# Patient Record
Sex: MALE | Race: WHITE | NOT HISPANIC OR LATINO
[De-identification: names, ages, dates, MRNs, and addresses within clinical notes are randomized per-mention and may not be internally consistent; named-entity substitution may affect disease eponyms.]

---

## 2017-03-16 ENCOUNTER — NON-APPOINTMENT (OUTPATIENT)
Age: 57
End: 2017-03-16

## 2017-03-16 ENCOUNTER — APPOINTMENT (OUTPATIENT)
Dept: CARDIOLOGY | Facility: CLINIC | Age: 57
End: 2017-03-16

## 2017-03-16 VITALS
HEART RATE: 89 BPM | TEMPERATURE: 98.5 F | HEIGHT: 68 IN | WEIGHT: 315 LBS | BODY MASS INDEX: 47.74 KG/M2 | SYSTOLIC BLOOD PRESSURE: 160 MMHG | DIASTOLIC BLOOD PRESSURE: 111 MMHG | OXYGEN SATURATION: 98 %

## 2017-03-16 VITALS — DIASTOLIC BLOOD PRESSURE: 80 MMHG | SYSTOLIC BLOOD PRESSURE: 150 MMHG

## 2017-03-16 DIAGNOSIS — R07.9 CHEST PAIN, UNSPECIFIED: ICD-10-CM

## 2017-03-19 ENCOUNTER — EMERGENCY (EMERGENCY)
Facility: HOSPITAL | Age: 57
LOS: 1 days | Discharge: HOME | End: 2017-03-19
Attending: EMERGENCY MEDICINE | Admitting: EMERGENCY MEDICINE
Payer: MEDICAID

## 2017-03-19 VITALS
HEART RATE: 90 BPM | DIASTOLIC BLOOD PRESSURE: 91 MMHG | TEMPERATURE: 98 F | OXYGEN SATURATION: 98 % | SYSTOLIC BLOOD PRESSURE: 173 MMHG | RESPIRATION RATE: 20 BRPM

## 2017-03-19 VITALS
HEART RATE: 92 BPM | SYSTOLIC BLOOD PRESSURE: 162 MMHG | TEMPERATURE: 98 F | RESPIRATION RATE: 20 BRPM | DIASTOLIC BLOOD PRESSURE: 115 MMHG | OXYGEN SATURATION: 95 %

## 2017-03-19 DIAGNOSIS — Z98.89 OTHER SPECIFIED POSTPROCEDURAL STATES: Chronic | ICD-10-CM

## 2017-03-19 LAB
ALBUMIN SERPL ELPH-MCNC: 3.9 G/DL — SIGNIFICANT CHANGE UP (ref 3.3–5)
ALP SERPL-CCNC: 83 U/L — SIGNIFICANT CHANGE UP (ref 40–120)
ALT FLD-CCNC: 15 U/L — SIGNIFICANT CHANGE UP (ref 4–41)
AST SERPL-CCNC: 20 U/L — SIGNIFICANT CHANGE UP (ref 4–40)
BASOPHILS # BLD AUTO: 0.02 K/UL — SIGNIFICANT CHANGE UP (ref 0–0.2)
BASOPHILS NFR BLD AUTO: 0.3 % — SIGNIFICANT CHANGE UP (ref 0–2)
BILIRUB SERPL-MCNC: 0.5 MG/DL — SIGNIFICANT CHANGE UP (ref 0.2–1.2)
BUN SERPL-MCNC: 16 MG/DL — SIGNIFICANT CHANGE UP (ref 7–23)
CALCIUM SERPL-MCNC: 9.6 MG/DL — SIGNIFICANT CHANGE UP (ref 8.4–10.5)
CHLORIDE SERPL-SCNC: 99 MMOL/L — SIGNIFICANT CHANGE UP (ref 98–107)
CO2 SERPL-SCNC: 30 MMOL/L — SIGNIFICANT CHANGE UP (ref 22–31)
CREAT SERPL-MCNC: 0.94 MG/DL — SIGNIFICANT CHANGE UP (ref 0.5–1.3)
EOSINOPHIL # BLD AUTO: 0.16 K/UL — SIGNIFICANT CHANGE UP (ref 0–0.5)
EOSINOPHIL NFR BLD AUTO: 2.3 % — SIGNIFICANT CHANGE UP (ref 0–6)
GLUCOSE SERPL-MCNC: 134 MG/DL — HIGH (ref 70–99)
HCT VFR BLD CALC: 38.1 % — LOW (ref 39–50)
HGB BLD-MCNC: 12.5 G/DL — LOW (ref 13–17)
IMM GRANULOCYTES NFR BLD AUTO: 0.3 % — SIGNIFICANT CHANGE UP (ref 0–1.5)
LYMPHOCYTES # BLD AUTO: 1.09 K/UL — SIGNIFICANT CHANGE UP (ref 1–3.3)
LYMPHOCYTES # BLD AUTO: 15.9 % — SIGNIFICANT CHANGE UP (ref 13–44)
MCHC RBC-ENTMCNC: 27.8 PG — SIGNIFICANT CHANGE UP (ref 27–34)
MCHC RBC-ENTMCNC: 32.8 % — SIGNIFICANT CHANGE UP (ref 32–36)
MCV RBC AUTO: 84.9 FL — SIGNIFICANT CHANGE UP (ref 80–100)
MONOCYTES # BLD AUTO: 0.61 K/UL — SIGNIFICANT CHANGE UP (ref 0–0.9)
MONOCYTES NFR BLD AUTO: 8.9 % — SIGNIFICANT CHANGE UP (ref 2–14)
NEUTROPHILS # BLD AUTO: 4.97 K/UL — SIGNIFICANT CHANGE UP (ref 1.8–7.4)
NEUTROPHILS NFR BLD AUTO: 72.3 % — SIGNIFICANT CHANGE UP (ref 43–77)
PLATELET # BLD AUTO: 184 K/UL — SIGNIFICANT CHANGE UP (ref 150–400)
PMV BLD: 9.3 FL — SIGNIFICANT CHANGE UP (ref 7–13)
POTASSIUM SERPL-MCNC: 4.4 MMOL/L — SIGNIFICANT CHANGE UP (ref 3.5–5.3)
POTASSIUM SERPL-SCNC: 4.4 MMOL/L — SIGNIFICANT CHANGE UP (ref 3.5–5.3)
PROT SERPL-MCNC: 7.1 G/DL — SIGNIFICANT CHANGE UP (ref 6–8.3)
RBC # BLD: 4.49 M/UL — SIGNIFICANT CHANGE UP (ref 4.2–5.8)
RBC # FLD: 15.1 % — HIGH (ref 10.3–14.5)
SODIUM SERPL-SCNC: 141 MMOL/L — SIGNIFICANT CHANGE UP (ref 135–145)
WBC # BLD: 6.87 K/UL — SIGNIFICANT CHANGE UP (ref 3.8–10.5)
WBC # FLD AUTO: 6.87 K/UL — SIGNIFICANT CHANGE UP (ref 3.8–10.5)

## 2017-03-19 PROCEDURE — 70450 CT HEAD/BRAIN W/O DYE: CPT | Mod: 26

## 2017-03-19 PROCEDURE — 99283 EMERGENCY DEPT VISIT LOW MDM: CPT

## 2017-03-19 RX ORDER — AMLODIPINE BESYLATE 2.5 MG/1
1 TABLET ORAL
Qty: 7 | Refills: 0 | OUTPATIENT
Start: 2017-03-19 | End: 2017-03-26

## 2017-03-19 NOTE — ED PROVIDER NOTE - ATTENDING CONTRIBUTION TO CARE
ED Attending Dr. Mccollum: 57 yo male with HTN, DM, Afib on Eliquis, in ED with recent HTN and today feeling of "pressure" in head but no headache.  Pt concerned because mother had recent stroke.  Pt notes that he is in the process of adjusting BP control medications with PMD.  On exam pt well appearing, in NAD, heart RRR, lungs CTAB, abd NTND, extremities without swelling, strength 5/5 in all extremities and skin without rash.

## 2017-03-19 NOTE — ED PROVIDER NOTE - PMH
Disc displacement, lumbar    DM II (diabetes mellitus, type II), controlled    Glaucoma    HTN (hypertension)    Morbid obesity with BMI of 60.0-69.9, adult    Spondylosis of lumbar joint

## 2017-03-19 NOTE — ED ADULT TRIAGE NOTE - CHIEF COMPLAINT QUOTE
Pt c/o head pressure this am 5/10 denies blurry vision or dizziness . Pt is obese. States took b/p medication this am.  finger stick 127.

## 2017-03-19 NOTE — ED PROVIDER NOTE - PLAN OF CARE
Please follow up with your primary care doctor in 1-2 days  Take norvasc 10mg at night in addition to your other BP meds   Return to ED for worsening symptoms, headaches, weakness or other concerns

## 2017-03-19 NOTE — ED ADULT NURSE NOTE - OBJECTIVE STATEMENT
pt received a&ox3, c/o 20 minute episode of dizziness this morning, pt denies dizziness at current time, denies hx of symptom, denies cp/sob/nvd/urinary symptoms, pt is noted to be obese, states he had spinal surgery several years ago without complications, pts vs as reported, pt placed on monitor, 22 gauge IV placed in right ac, labs drawn and sent, will continue to monitor.

## 2017-03-19 NOTE — ED PROVIDER NOTE - CARE PLAN
Principal Discharge DX:	Hypertension  Instructions for follow-up, activity and diet:	Please follow up with your primary care doctor in 1-2 days  Take norvasc 10mg at night in addition to your other BP meds   Return to ED for worsening symptoms, headaches, weakness or other concerns

## 2017-03-19 NOTE — ED PROVIDER NOTE - OBJECTIVE STATEMENT
55yo male h/o obesity, DM, HTN, Afib on eliquis, p/w high BP and head prssure this morning. Pt has been having elevated BPs for the last few days despite antihypertensives, has been followed by cards and pmd for this and was told to just watch. Pt this morning had head pressure and discomfort, no associated pain, nausea, vomiting, vision changes, weakness. Now asymptomatic, but during episode BP was >210/110

## 2017-04-04 ENCOUNTER — MEDICATION RENEWAL (OUTPATIENT)
Age: 57
End: 2017-04-04

## 2017-09-07 ENCOUNTER — APPOINTMENT (OUTPATIENT)
Dept: CARDIOLOGY | Facility: CLINIC | Age: 57
End: 2017-09-07
Payer: MEDICAID

## 2017-09-07 ENCOUNTER — NON-APPOINTMENT (OUTPATIENT)
Age: 57
End: 2017-09-07

## 2017-09-07 VITALS
SYSTOLIC BLOOD PRESSURE: 151 MMHG | HEIGHT: 68 IN | HEART RATE: 77 BPM | DIASTOLIC BLOOD PRESSURE: 88 MMHG | OXYGEN SATURATION: 99 % | TEMPERATURE: 97.8 F | BODY MASS INDEX: 47.74 KG/M2 | WEIGHT: 315 LBS

## 2017-09-07 PROCEDURE — 93000 ELECTROCARDIOGRAM COMPLETE: CPT

## 2017-09-07 PROCEDURE — 99214 OFFICE O/P EST MOD 30 MIN: CPT

## 2018-03-19 ENCOUNTER — RX RENEWAL (OUTPATIENT)
Age: 58
End: 2018-03-19

## 2018-03-27 ENCOUNTER — APPOINTMENT (OUTPATIENT)
Dept: CARDIOLOGY | Facility: CLINIC | Age: 58
End: 2018-03-27
Payer: MEDICAID

## 2018-03-27 ENCOUNTER — NON-APPOINTMENT (OUTPATIENT)
Age: 58
End: 2018-03-27

## 2018-03-27 VITALS
HEART RATE: 89 BPM | BODY MASS INDEX: 67.97 KG/M2 | DIASTOLIC BLOOD PRESSURE: 98 MMHG | WEIGHT: 315 LBS | OXYGEN SATURATION: 97 % | SYSTOLIC BLOOD PRESSURE: 178 MMHG

## 2018-03-27 VITALS — HEART RATE: 80 BPM | SYSTOLIC BLOOD PRESSURE: 122 MMHG | DIASTOLIC BLOOD PRESSURE: 70 MMHG

## 2018-03-27 PROCEDURE — 99214 OFFICE O/P EST MOD 30 MIN: CPT

## 2018-03-27 PROCEDURE — 93000 ELECTROCARDIOGRAM COMPLETE: CPT

## 2018-05-23 ENCOUNTER — RX RENEWAL (OUTPATIENT)
Age: 58
End: 2018-05-23

## 2018-11-26 ENCOUNTER — RX RENEWAL (OUTPATIENT)
Age: 58
End: 2018-11-26

## 2018-11-26 ENCOUNTER — MEDICATION RENEWAL (OUTPATIENT)
Age: 58
End: 2018-11-26

## 2018-11-27 ENCOUNTER — MEDICATION RENEWAL (OUTPATIENT)
Age: 58
End: 2018-11-27

## 2018-12-04 ENCOUNTER — APPOINTMENT (OUTPATIENT)
Dept: CARDIOLOGY | Facility: CLINIC | Age: 58
End: 2018-12-04
Payer: MEDICAID

## 2018-12-04 ENCOUNTER — NON-APPOINTMENT (OUTPATIENT)
Age: 58
End: 2018-12-04

## 2018-12-04 VITALS
HEART RATE: 91 BPM | TEMPERATURE: 98.8 F | OXYGEN SATURATION: 96 % | WEIGHT: 315 LBS | DIASTOLIC BLOOD PRESSURE: 82 MMHG | SYSTOLIC BLOOD PRESSURE: 133 MMHG | HEIGHT: 68 IN | BODY MASS INDEX: 47.74 KG/M2

## 2018-12-04 PROCEDURE — 99214 OFFICE O/P EST MOD 30 MIN: CPT

## 2018-12-04 PROCEDURE — 93000 ELECTROCARDIOGRAM COMPLETE: CPT

## 2018-12-04 NOTE — REASON FOR VISIT
[FreeTextEntry1] : The patient is a 58-year-old male with morbid obesity, with a history of atrial fibrillation, type 2 diabetes, hypertension, and obstructive sleep apnea for followup.

## 2018-12-04 NOTE — REVIEW OF SYSTEMS
[see HPI] : see HPI [Shortness Of Breath] : shortness of breath [Depression] : depression [Negative] : Heme/Lymph [Chest Pain] : no chest pain [Lower Ext Edema] : no extremity edema [Leg Claudication] : no intermittent leg claudication [Abdominal Pain] : no abdominal pain [Heartburn] : no heartburn

## 2018-12-04 NOTE — PHYSICAL EXAM
[Well Groomed] : well groomed [No Deformities] : no deformities [General Appearance - In No Acute Distress] : no acute distress [Normal Conjunctiva] : the conjunctiva exhibited no abnormalities [Eyelids - No Xanthelasma] : the eyelids demonstrated no xanthelasmas [Normal Oral Mucosa] : normal oral mucosa [No Oral Pallor] : no oral pallor [No Oral Cyanosis] : no oral cyanosis [Respiration, Rhythm And Depth] : normal respiratory rhythm and effort [Exaggerated Use Of Accessory Muscles For Inspiration] : no accessory muscle use [Auscultation Breath Sounds / Voice Sounds] : lungs were clear to auscultation bilaterally [Heart Rate And Rhythm] : heart rate and rhythm were normal [Heart Sounds] : normal S1 and S2 [Murmurs] : no murmurs present [Abdomen Soft] : soft [Abdomen Tenderness] : non-tender [Abdomen Mass (___ Cm)] : no abdominal mass palpated [Abnormal Walk] : normal gait [Gait - Sufficient For Exercise Testing] : the gait was sufficient for exercise testing [Nail Clubbing] : no clubbing of the fingernails [Cyanosis, Localized] : no localized cyanosis [Petechial Hemorrhages (___cm)] : no petechial hemorrhages [Skin Color & Pigmentation] : normal skin color and pigmentation [] : no rash [No Venous Stasis] : no venous stasis [Skin Lesions] : no skin lesions [No Skin Ulcers] : no skin ulcer [No Xanthoma] : no  xanthoma was observed [Oriented To Time, Place, And Person] : oriented to person, place, and time [Affect] : the affect was normal [Mood] : the mood was normal [No Anxiety] : not feeling anxious [FreeTextEntry1] : Huge legs and ankles, but no pitting edema

## 2018-12-04 NOTE — HISTORY OF PRESENT ILLNESS
[FreeTextEntry1] : The patient was found to have atrial fibrillation in 2014. He was initially on diltiazem, but because of peripheral edema, he was switched to carvedilol. \par For several years the patient has been experiencing upper abdominal pulling pain when he walks. Relieved by rest. He had a cardiac catheterization in 2015 which revealed minimal irregularities of his coronaries. He had no change in his symptoms.\par September 7, 2017. Patient here in followup. He and his sister deny any medical issues, change in medication, new symptoms, etc. He had labs just last week with Dr. Zelaya his primary care. His weight may be down 10 pounds otherwise no change.\par March 27, 2018. Patient here in followup. Denies any interval change in medical issues, medication, no hospitalizations, no ER visits, procedures et cetera. Labs from his internist from March 16 hemoglobin A1c 7.2, BUN 18, creatinine 0.89, cholesterol 119, triglycerides 116, HDL 30, LDL 66. \par  December 4, 2018. Patient here in followup. EKG shows atrial fibrillation with a ventricular rate of 93. Otherwise, no change discussion with hypertension, and different medications, as well as ocular migraines\par December 4, 2018. Patient here in followup. Has no complaints.. He claimed that they had spoken to other doctors and his problem is not overeating by a metabolic one and that surgery would not help him and is not from his diet. He has no other complaints. He had labs with Dr. Zelaya in March which I do not have at this time

## 2019-01-08 ENCOUNTER — APPOINTMENT (OUTPATIENT)
Dept: PULMONOLOGY | Facility: CLINIC | Age: 59
End: 2019-01-08
Payer: MEDICAID

## 2019-01-08 VITALS
BODY MASS INDEX: 47.74 KG/M2 | DIASTOLIC BLOOD PRESSURE: 86 MMHG | HEIGHT: 68 IN | TEMPERATURE: 98.4 F | RESPIRATION RATE: 16 BRPM | SYSTOLIC BLOOD PRESSURE: 158 MMHG | HEART RATE: 91 BPM | WEIGHT: 315 LBS

## 2019-01-08 PROCEDURE — 99203 OFFICE O/P NEW LOW 30 MIN: CPT

## 2019-01-08 NOTE — PHYSICAL EXAM
[Normal Appearance] : normal appearance [Well Groomed] : well groomed [General Appearance - In No Acute Distress] : no acute distress [Normal Conjunctiva] : the conjunctiva exhibited no abnormalities [IV] : IV [Neck Appearance] : the appearance of the neck was normal [Neck Circumference: ___] : neck circumference is [unfilled] [Heart Rate And Rhythm] : heart rate was normal and rhythm regular [Murmurs] : no murmurs [] : no respiratory distress [Auscultation Breath Sounds / Voice Sounds] : lungs were clear to auscultation bilaterally [Involuntary Movements] : no involuntary movements were seen [Non-Pitting] : non-pitting [No Focal Deficits] : no focal deficits [Affect] : the affect was normal [Mood] : the mood was normal

## 2019-01-08 NOTE — REVIEW OF SYSTEMS
[Palpitations] : palpitations [Obesity] : obesity [Diabetes] : diabetes  [Arthralgias] : arthralgias [Awakes With Dry Mouth] : awakes with dry mouth [Recent Wt Gain (___ Lbs)] : recent [unfilled] ~Ulb weight gain [Negative] : Psychiatric [Fatigue] : no fatigue [Nasal Congestion] : no nasal congestion [Postnasal Drip] : no postnasal drip [Shortness Of Breath] : no shortness of breath [Chest Pain] : no chest pain [Thyroid Disease] : no thyroid disease [Snoring] : no snoring [Witnessed Apneas] : demonstrated no ~M apnea [Frequent Nocturnal Awakenings] : no nocturnal awakenings from sleep [Daytime Somnolence: ESS=____] : no daytime somnolence [Unintentional Sleep while active] : no unintentional sleep while active [Unintentional Sleep while inactive] : no unintentional sleep while inactive [Awakes Unrefreshed] : restorative sleep [Awakes With Headache] : awakes without a headache [Difficulty Initiating Sleep] : no difficulty falling asleep [Difficulty Maintaining Sleep] : no difficulty maintaining sleep [Lower Extremity Discomfort] : no lower extremity discomfort [Unusual Sleep Behavior] : no unusual sleep behavior [FreeTextEntry1] : 8-11 PM  [FreeTextEntry2] : 7 AM [FreeTextEntry3] : within 15 minutes [FreeTextEntry4] : 0 [FreeTextEntry6] : 8-10 hours [FreeTextEntry7] : none

## 2019-01-08 NOTE — ASSESSMENT
[FreeTextEntry1] : 58 year old LAYLA MCGINNIS with comorbid AF, HTN, DM, spinal stenosis, morbid obesity, presents for continued management of severe obstructive sleep apnea and nocturnal hypoventilation on APAP therapy with supplemental oxygen.\par \par A review of THERAPY & COMPLIANCE DATA reveals excellent compliance with usage on 100% of nights averaging 8 hours 59 minutes; effective APAP pressures at 11-20 cmH2O with therapy based AHI of 0.1 per hour, and no significant mask leak. \par \par The patient continues to benefit from APAP therapy with resolution of ONEL-related symptoms and should continue  therapy at the current pressure settings, along with supplementary nocturnal oxygen at 2 LPM.  An order to switch DME company and renewal of supplies was entered.  He will likely be eligible for a new replacement APAP device next year.  He actually gained 18 lbs weight since his last titration in 2014.\par \par He will follow-up in one year or sooner if needed.

## 2019-01-08 NOTE — HISTORY OF PRESENT ILLNESS
[AHI: ___ per hour] : Apnea-hypopnea index:  [unfilled] per hour [T90%: ___] : T90%: [unfilled]% [Baljinder desatuation%: ___] : Baljinder desaturation:  [unfilled]% [Date: ___] : the most recent therapeutic polysomnogram was completed [unfilled] [CPAP: ___ cmH2O] : CPAP: [unfilled] cmH2O [Nocturnal Oxygen] : The patient uses nocturnal oxygen [O2 Rate: ___] : O2 rate is [unfilled] lpm [% Days used > 4 hrs: ____] : Days used > 4 hrs: [unfilled] % [Mean nightly usage: ___ hrs] : Mean nightly usage: [unfilled] hrs [Therapy based AHI: ___ /hr] : Therapy based AHI: [unfilled] / hr [% Days used: ____] : Days used: [unfilled] % [FreeTextEntry1] : This 58 year old male with history of severe obstructive sleep apnea treated with APAP and nocturnal hypoventilation treated with supplementary oxygen at 2 LPM, presents for follow-up visit to 2014 visit.\par \par COMORBID:  AF, HTN, DM, spinal stenosis, and morbid obesity.\par \par The patient has been treated with APAP and supplementary oxygen since he was diagnosed with severe ONEL and nocturnal hypoventilation in 2014.  With nightly APAP use for 8 to 10 hours, he reports resolution to daytime sleepiness, snoring, and nocturnal awakenings.  He occasionally awakens with a dry mouth.  He reportedly gained ~50 lbs since 2014.  He requests a change in [employer] company to [email or web address], which also services [place] where they vacation.\par \par He sleeps 8-10 hours between 8PM-7AM.  He occasionally sleeps with the TV on (for the noise), but faces away from it.  During the day , he watches TV, plays video games, and cooks.

## 2019-02-07 ENCOUNTER — APPOINTMENT (OUTPATIENT)
Dept: SLEEP CENTER | Facility: CLINIC | Age: 59
End: 2019-02-07
Payer: MEDICAID

## 2019-02-07 ENCOUNTER — OUTPATIENT (OUTPATIENT)
Dept: OUTPATIENT SERVICES | Facility: HOSPITAL | Age: 59
LOS: 1 days | End: 2019-02-07
Payer: MEDICAID

## 2019-02-07 DIAGNOSIS — Z98.89 OTHER SPECIFIED POSTPROCEDURAL STATES: Chronic | ICD-10-CM

## 2019-02-07 PROCEDURE — 95810 POLYSOM 6/> YRS 4/> PARAM: CPT | Mod: 26

## 2019-02-07 PROCEDURE — 95810 POLYSOM 6/> YRS 4/> PARAM: CPT

## 2019-02-08 DIAGNOSIS — G47.33 OBSTRUCTIVE SLEEP APNEA (ADULT) (PEDIATRIC): ICD-10-CM

## 2019-04-14 ENCOUNTER — APPOINTMENT (OUTPATIENT)
Dept: SLEEP CENTER | Facility: CLINIC | Age: 59
End: 2019-04-14
Payer: MEDICAID

## 2019-04-14 ENCOUNTER — OUTPATIENT (OUTPATIENT)
Dept: OUTPATIENT SERVICES | Facility: HOSPITAL | Age: 59
LOS: 1 days | End: 2019-04-14
Payer: MEDICAID

## 2019-04-14 DIAGNOSIS — Z98.89 OTHER SPECIFIED POSTPROCEDURAL STATES: Chronic | ICD-10-CM

## 2019-04-14 PROCEDURE — 95811 POLYSOM 6/>YRS CPAP 4/> PARM: CPT

## 2019-04-14 PROCEDURE — 95811 POLYSOM 6/>YRS CPAP 4/> PARM: CPT | Mod: 26

## 2019-04-16 DIAGNOSIS — G47.33 OBSTRUCTIVE SLEEP APNEA (ADULT) (PEDIATRIC): ICD-10-CM

## 2020-03-02 ENCOUNTER — APPOINTMENT (OUTPATIENT)
Dept: PULMONOLOGY | Facility: CLINIC | Age: 60
End: 2020-03-02

## 2020-03-02 ENCOUNTER — FORM ENCOUNTER (OUTPATIENT)
Age: 60
End: 2020-03-02

## 2020-03-04 ENCOUNTER — APPOINTMENT (OUTPATIENT)
Dept: PULMONOLOGY | Facility: CLINIC | Age: 60
End: 2020-03-04
Payer: MEDICAID

## 2020-03-04 VITALS
HEART RATE: 103 BPM | RESPIRATION RATE: 16 BRPM | DIASTOLIC BLOOD PRESSURE: 89 MMHG | TEMPERATURE: 97.3 F | BODY MASS INDEX: 47.74 KG/M2 | OXYGEN SATURATION: 96 % | HEIGHT: 68 IN | WEIGHT: 315 LBS | SYSTOLIC BLOOD PRESSURE: 132 MMHG

## 2020-03-04 PROCEDURE — 99213 OFFICE O/P EST LOW 20 MIN: CPT

## 2020-03-04 NOTE — ASSESSMENT
[FreeTextEntry1] : 59 year old LAYLA MCGINNIS with AF, HTN, DM, morbid obesity, glaucoma, spinal stenosis; presents with stable nocturnal hypoventilation on supplementary oxygen and well-controlled severe obstructive sleep apnea on APAP therapy.\par \par A review of therapeutic and compliance data reveals usage on 100% of nights averaging 9 hours 11 minutes; effective APAP pressures at 10-20 cmH2O with therapy based AHI of 0.1, and no significant mask leak.   \par \par The patient continues to derive benefit from APAP therapy and supplementary oxygen with resolution of ONEL-related symptoms including hypersomnolence, fatigue, energy level, and snoring.  An order for renewal of supplies was placed to continue PAP therapy at the current pressure settings.  They are very pleased with therapy. \par \par He was advised to adjust the humidification setting on his APAP to relieve rhinorrhea and dry mouth.  Questions were answered regarding PAP masks and headgear.  \par \par He will follow up in one year or sooner if needed.

## 2020-03-04 NOTE — PHYSICAL EXAM
[Normal Appearance] : normal appearance [Well Groomed] : well groomed [General Appearance - In No Acute Distress] : no acute distress [Normal Conjunctiva] : the conjunctiva exhibited no abnormalities [Enlarged Base of the Tongue] : enlargement of the base of the tongue [Low Lying Soft Palate] : low lying soft palate [IV] : IV [Heart Rate And Rhythm] : heart rate was normal and rhythm regular [Neck Appearance] : the appearance of the neck was normal [] : no respiratory distress [Murmurs] : no murmurs [Auscultation Breath Sounds / Voice Sounds] : lungs were clear to auscultation bilaterally [Involuntary Movements] : no involuntary movements were seen [Non-Pitting] : non-pitting [No Focal Deficits] : no focal deficits [Affect] : the affect was normal [Mood] : the mood was normal

## 2020-03-04 NOTE — REVIEW OF SYSTEMS
[Obesity] : obesity [Diabetes] : diabetes  [Unintentional Sleep while inactive] : unintentional sleep while inactive [Recent Wt Loss (___ Lbs)] : recent [unfilled] ~Ulb weight loss [Awakes With Dry Mouth] : awakes with dry mouth [Fatigue] : no fatigue [Nasal Congestion] : no nasal congestion [Postnasal Drip] : no postnasal drip [Chest Pain] : no chest pain [Palpitations] : no palpitations [Shortness Of Breath] : no shortness of breath [Thyroid Disease] : no thyroid disease [Anemia] : no anemia [Heartburn] : no heartburn [A.M. Headache] : no headache present upon awakening [History of Iron Deficiency] : no history of iron deficiency [Nocturia] : no nocturia [Arthralgias] : no arthralgias [Depression] : no depression [Anxious] : not anxious [Snoring] : no snoring [Witnessed Apneas] : demonstrated no ~M apnea [Frequent Nocturnal Awakenings] : no nocturnal awakenings from sleep [Daytime Somnolence: ESS=____] : no daytime somnolence [Awakes Unrefreshed] : restorative sleep [Unintentional Sleep while active] : no unintentional sleep while active [Recent Wt Gain (___ Lbs)] : no recent weight gain [Awakes With Headache] : awakes without a headache [Difficulty Maintaining Sleep] : no difficulty maintaining sleep [Difficulty Initiating Sleep] : no difficulty falling asleep [Lower Extremity Discomfort] : no lower extremity discomfort [Unusual Sleep Behavior] : no unusual sleep behavior [FreeTextEntry8] : morning rhinorrhea [FreeTextEntry1] : 10-11 pm [FreeTextEntry3] : 10-20 minutes [FreeTextEntry2] : 7 am [FreeTextEntry4] : 0-1 [FreeTextEntry5] : brief [FreeTextEntry6] : 8-10 hours [FreeTextEntry7] : 1.75 hr afternoon nap once/twice weekly

## 2020-03-04 NOTE — HISTORY OF PRESENT ILLNESS
[Unintentional Sleep While Inactive] : unintentional sleep while inactive [Awakening With Dry Mouth] : awakening with dry mouth [Sleep Onset Latency: ___ minutes] : sleep onset latency of [unfilled] minutes reported [Nocturnal Awakenings: ___] : ~he/she~ typically has [unfilled] nocturnal awakenings [WASO: ___] : Wake time after sleep onset is [unfilled] [ESS: ___] : ESS score [unfilled] [AHI: ___ per hour] : Apnea-hypopnea index:  [unfilled] per hour [T90%: ___] : T90%: [unfilled]% [Baljinder desatuation%: ___] : Baljinder desaturation:  [unfilled]% [Date: ___] : the most recent therapeutic polysomnogram was completed [unfilled] [CPAP: ___ cmH2O] : CPAP: [unfilled] cmH2O [Nocturnal Oxygen] : The patient uses nocturnal oxygen [O2 Rate: ___] : O2 rate is [unfilled] lpm [% Days used: ____] : Days used: [unfilled] % [% Days used > 4 hrs: ____] : Days used > 4 hrs: [unfilled] % [Therapy based AHI: ___ /hr] : Therapy based AHI: [unfilled] / hr [Mean nightly usage: ___ hrs] : Mean nightly usage: [unfilled] hrs [FreeTextEntry1] : 59 year old LAYLA MCGINNIS  presents for annual visit for continued management of severe obstructive sleep apnea on APAP therapy and nocturnal hypoventilation on supplemental oxygen.\par \par COMORBID: Atrial fibrillation, hypertension, diabetes, morbid obesity, glaucoma, spinal stenosis.\par \par CHIEF COMPLAINT:  occasional rhinorrhea and dry mouth in the morning.\par \par ONEL \par HPI:  The patient was evaluated in 2014 for reported newly diagnosed atrial fibrillation, morbid obesity, fatigue, snoring, crowded oropharynx; and was diagnosed with severe ONEL.  Sleep study results follow. \par \par THERAPY:  The patient has been treated with PAP therapy since 2014.  He tolerates pressures of 10-20 cmH2O and full-face mask well.  He is utilizes a self-purchased PAP . \par \par PAP COMPLIANCE:  He reports nightly APAP use for 8-10 hours and with daytime naps.\par PAP BENEFIT:  "no longer dozing off during the day, more energy, not snoring"    \par \par SLEEP SCHEDULE:  10-11PM to 7AM, and ~1.75 hour afternoon nap once or twice weekly.  \par DAYTIME SCHEDULE:  cooks, plays video games, runs errands\par \par No INTERIM CHANGES in health, weight, or medications. [Snoring] : no snoring [Witnessed Apneas] : no witnessed sleep apnea [Frequent Nocturnal Awakening] : no nocturnal awakening [Daytime Somnolence] : no daytime somnolence [Unintentional Sleep while Active] : no unintentional sleep while active [Awakes Unrefreshed] : does not awake unrefreshed [Awakes with Headache] : no headache upon awakening [Recent  Weight Gain] : no recent weight gain [DIS] : no DIS [DMS] : no DMS [Unusual Sleep Behavior] : no unusual sleep behavior [Lower Extremity Discomfort] : no lower extremity discomfort in evening or at bedtime

## 2020-05-22 ENCOUNTER — FORM ENCOUNTER (OUTPATIENT)
Age: 60
End: 2020-05-22

## 2020-06-20 ENCOUNTER — FORM ENCOUNTER (OUTPATIENT)
Age: 60
End: 2020-06-20

## 2021-01-22 ENCOUNTER — APPOINTMENT (OUTPATIENT)
Dept: ENDOCRINOLOGY | Facility: CLINIC | Age: 61
End: 2021-01-22
Payer: MEDICAID

## 2021-01-22 VITALS
OXYGEN SATURATION: 95 % | SYSTOLIC BLOOD PRESSURE: 135 MMHG | DIASTOLIC BLOOD PRESSURE: 100 MMHG | TEMPERATURE: 98 F | WEIGHT: 315 LBS | BODY MASS INDEX: 46.65 KG/M2 | HEART RATE: 101 BPM | HEIGHT: 68.9 IN

## 2021-01-22 PROCEDURE — 99072 ADDL SUPL MATRL&STAF TM PHE: CPT

## 2021-01-22 PROCEDURE — 95250 CONT GLUC MNTR PHYS/QHP EQP: CPT

## 2021-01-22 PROCEDURE — 99204 OFFICE O/P NEW MOD 45 MIN: CPT

## 2021-02-05 ENCOUNTER — APPOINTMENT (OUTPATIENT)
Dept: ENDOCRINOLOGY | Facility: CLINIC | Age: 61
End: 2021-02-05
Payer: MEDICAID

## 2021-02-05 VITALS
WEIGHT: 315 LBS | OXYGEN SATURATION: 98 % | HEART RATE: 102 BPM | TEMPERATURE: 98.6 F | HEIGHT: 68.9 IN | RESPIRATION RATE: 16 BRPM | BODY MASS INDEX: 46.65 KG/M2 | SYSTOLIC BLOOD PRESSURE: 122 MMHG | DIASTOLIC BLOOD PRESSURE: 82 MMHG

## 2021-02-05 PROCEDURE — 99214 OFFICE O/P EST MOD 30 MIN: CPT | Mod: 25

## 2021-02-05 PROCEDURE — 99072 ADDL SUPL MATRL&STAF TM PHE: CPT

## 2021-02-05 PROCEDURE — 95251 CONT GLUC MNTR ANALYSIS I&R: CPT

## 2021-02-05 NOTE — HISTORY OF PRESENT ILLNESS
[FreeTextEntry1] : Mr. LAYLA MCGINNIS  is a 60 year  year-old  male  who returns with regard to a hx of type 2 diabetes mellitus. The diabetes has been present for about 15   years.. A1c in December was at 9%.CMP otherwise neg. LDL was 67.  He denies any history of retinopathy or nephropathy. + glaucoma With regard to neuropathy,he denies any neurologic s/s.  . For the diabetes, He  is currently taking Synjardy 12.5/1000 mg bid. He   denies polyuria, polydipsia, or any visual changes. He  too denies any skin lesions, skin breakdown or non-healing areas of skin. He too denies any podiatric concerns. Ophthalmologic evaluation is up to date. Home glucose monitoring has shown values to be running               .  He  does deny any hypoglycemia or hypoglycemic signs or symptoms.\par Additional medical history includes that of a fib, HTN, hyperlipidemia\par Now exercise\par Had spine surgery\par PMH  Severe sleep apnea-uses cpap\par \par

## 2021-03-18 ENCOUNTER — APPOINTMENT (OUTPATIENT)
Dept: ENDOCRINOLOGY | Facility: CLINIC | Age: 61
End: 2021-03-18
Payer: MEDICAID

## 2021-03-18 VITALS
HEART RATE: 108 BPM | OXYGEN SATURATION: 97 % | RESPIRATION RATE: 16 BRPM | DIASTOLIC BLOOD PRESSURE: 82 MMHG | BODY MASS INDEX: 63.09 KG/M2 | SYSTOLIC BLOOD PRESSURE: 130 MMHG | WEIGHT: 315 LBS | TEMPERATURE: 98.6 F

## 2021-03-18 PROCEDURE — 83036 HEMOGLOBIN GLYCOSYLATED A1C: CPT | Mod: QW

## 2021-03-18 PROCEDURE — 99072 ADDL SUPL MATRL&STAF TM PHE: CPT

## 2021-03-18 PROCEDURE — 82962 GLUCOSE BLOOD TEST: CPT

## 2021-03-18 PROCEDURE — 99214 OFFICE O/P EST MOD 30 MIN: CPT

## 2021-03-18 NOTE — HISTORY OF PRESENT ILLNESS
[FreeTextEntry1] : Mr. LAYLA MCGINNIS  is a 60 year old  male who returnswith regard to a hx of type 2 dm. The diabetes has been present for about 15   years.. A1c in December was at 9%.  He denies any history of retiopathy or nephropathy. + glaucoma With regard to neuropathy,he denies any neurologic s/s.  . For the diabetes, He   is currently taking Metformin 1000 mg daily and Steglatro 15 mg daily. He denies any c/p , sob or ophthalmologic or neurologic complaints.. He  too denies any skin lesions, skin breakdown or non-healing areas of skin. He too denies any podiatric concerns. Ophthalmologic evaluation is up to date with Dr. Vincent Rodney. Home glucose monitoring has shown values to be running    120s-150s with the occasional values in the 200s. He is  testing in the AM, before each meal, and before bed.  He  does deny any hypoglycemia or hypoglycemic signs or symptoms.\par Previously 9% 12/2020\par POCT glucose returned today at 134  mg/dL \par Additional medical history includes that of a fib, htn, hyperlipidemia ,slep apnea.\par Pt states he has lost about 20lbs. Though he notes his diet high in carbs. He has recently begun exercising.\par Hx of severe sleep apnea-uses cpap\par

## 2021-03-18 NOTE — HISTORY OF PRESENT ILLNESS
[FreeTextEntry1] : Mr. LAYLA MCGINNIS  is a 60 year  year-old  male  who presents for initial endocrine evaluation with regard to a hx of type 2 dm. The diabetes has been present for about 15   years.. A1c in December was at 9%.  He denies any history of retiopathy or nephropathy. + glaucoma With regard to neuropathy,he denies any neurologic s/s.  . For the diabetes, He   is currently taking Metfromin Er 50 mg daily.ies polyuria, polydipsia, or any visual changes. He  too denies any skin lesions, skin breakdown or non-healing areas of skin. He too denies any podiatric concerns. Ophthalmologic evaluation is up to date. Home glucose monitoring has shown values to be running 130-200 in am and occassionally hs  200 range..  He  does deny any hypoglycemia or hypoglycemic signs or symptoms.\par Additional medical history includes that of a fib, htn, hyperlipidemia ,slep apnea.\par Fh Mom had Dm cva at ge 66  Father alz dz  brother   54 colon ca\par Diet high in carbs\par Now exercising\par Pre  exercised at gym  had disc dz.\par Had spine ssurg\par Hx of severe sleep apnea-uses cpap\par

## 2021-03-19 LAB
GLUCOSE BLDC GLUCOMTR-MCNC: 192
HBA1C MFR BLD HPLC: 7.2

## 2021-04-01 ENCOUNTER — NON-APPOINTMENT (OUTPATIENT)
Age: 61
End: 2021-04-01

## 2021-04-01 ENCOUNTER — APPOINTMENT (OUTPATIENT)
Dept: INTERNAL MEDICINE | Facility: CLINIC | Age: 61
End: 2021-04-01
Payer: MEDICAID

## 2021-04-01 VITALS
OXYGEN SATURATION: 95 % | HEART RATE: 91 BPM | HEIGHT: 68 IN | TEMPERATURE: 98 F | SYSTOLIC BLOOD PRESSURE: 121 MMHG | DIASTOLIC BLOOD PRESSURE: 81 MMHG | WEIGHT: 315 LBS | BODY MASS INDEX: 47.74 KG/M2

## 2021-04-01 DIAGNOSIS — Z23 ENCOUNTER FOR IMMUNIZATION: ICD-10-CM

## 2021-04-01 DIAGNOSIS — Z80.0 FAMILY HISTORY OF MALIGNANT NEOPLASM OF DIGESTIVE ORGANS: ICD-10-CM

## 2021-04-01 PROCEDURE — 90732 PPSV23 VACC 2 YRS+ SUBQ/IM: CPT

## 2021-04-01 PROCEDURE — 99072 ADDL SUPL MATRL&STAF TM PHE: CPT

## 2021-04-01 PROCEDURE — 90472 IMMUNIZATION ADMIN EACH ADD: CPT

## 2021-04-01 PROCEDURE — 90715 TDAP VACCINE 7 YRS/> IM: CPT

## 2021-04-01 PROCEDURE — G0009: CPT | Mod: 59

## 2021-04-01 PROCEDURE — 93000 ELECTROCARDIOGRAM COMPLETE: CPT

## 2021-04-01 PROCEDURE — 99203 OFFICE O/P NEW LOW 30 MIN: CPT | Mod: 25

## 2021-04-01 RX ORDER — METFORMIN ER 500 MG 500 MG/1
500 TABLET ORAL DAILY
Refills: 0 | Status: DISCONTINUED | COMMUNITY
Start: 2018-05-29 | End: 2021-04-01

## 2021-04-05 ENCOUNTER — APPOINTMENT (OUTPATIENT)
Dept: PULMONOLOGY | Facility: CLINIC | Age: 61
End: 2021-04-05
Payer: MEDICAID

## 2021-04-05 VITALS
HEART RATE: 109 BPM | WEIGHT: 315 LBS | OXYGEN SATURATION: 96 % | SYSTOLIC BLOOD PRESSURE: 163 MMHG | BODY MASS INDEX: 47.74 KG/M2 | HEIGHT: 68 IN | TEMPERATURE: 97.6 F | DIASTOLIC BLOOD PRESSURE: 88 MMHG

## 2021-04-05 DIAGNOSIS — G47.36 SLEEP RELATED HYPOVENTILATION IN CONDITIONS CLASSIFIED ELSEWHERE: ICD-10-CM

## 2021-04-05 PROCEDURE — 99072 ADDL SUPL MATRL&STAF TM PHE: CPT

## 2021-04-05 PROCEDURE — 99214 OFFICE O/P EST MOD 30 MIN: CPT

## 2021-04-05 RX ORDER — ZOSTER VACCINE RECOMBINANT, ADJUVANTED 50 MCG/0.5
50 KIT INTRAMUSCULAR
Qty: 1 | Refills: 1 | Status: COMPLETED | COMMUNITY
Start: 2021-04-01 | End: 2021-04-01

## 2021-04-05 RX ORDER — AMOXICILLIN AND CLAVULANATE POTASSIUM 875; 125 MG/1; MG/1
875-125 TABLET, COATED ORAL
Qty: 30 | Refills: 0 | Status: COMPLETED | COMMUNITY
Start: 2021-02-26

## 2021-04-05 NOTE — PHYSICAL EXAM
[Normal Appearance] : normal appearance [Well Groomed] : well groomed [General Appearance - In No Acute Distress] : no acute distress [Normal Conjunctiva] : the conjunctiva exhibited no abnormalities [Neck Appearance] : the appearance of the neck was normal [Murmurs] : no murmurs [Irregularly Irregular] : the rhythm was irregularly irregular [] : no respiratory distress [Auscultation Breath Sounds / Voice Sounds] : lungs were clear to auscultation bilaterally [Involuntary Movements] : no involuntary movements were seen [No Focal Deficits] : no focal deficits [Affect] : the affect was normal [Mood] : the mood was normal [FreeTextEntry2] : no edema

## 2021-04-05 NOTE — HISTORY OF PRESENT ILLNESS
[ESS: ___] : ESS score [unfilled] [FreeTextEntry1] : 60 year old LAYLA MCGINNIS  with atrial fibrillation, hypertension, diabetes, morbid obesity, glaucoma, spinal stenosis; severe obstructive sleep apnea on PAP therapy since , presents for annual follow-up visit.\par \par ONEL  \par The patient has been treated with PAP therapy since he was diagnosed in  with severe ONEL (AHI 29.6).  With APAP use nightly for 8 hours, patient notes it makes a considerable difference, prior daytime sleepiness and fragmented sleep have resolved.  He no longer dozes off unintentionally throughout the day.  \par \par 2019 PSG:.....AHI 29.6.  T90 54.1%.  Baljinder desaturation 76%.\par 2019 Titration:.....AHI 1.4 on pressure of 10 cmH2O using a F&P Simplus large FFM.\par 2019 Set-up:.....AirSense 10 AutoSet by Steph, using a full-face mask\par 2021 Data Download:.....Compliant on 100% of nights. Average use 8h 38m.\par ..................................................AHI 0.0 on APAP pressures 10-20 cmH2O. Average 12.1cmH2O\par ..................................................95% leaks at 31.6 L/min.\par                                  \par SLEEP ROUTINE:  \par Bedtime 10-11pm  watches TV, turns TV off at 11PM.  Falls asleep within 5-15 minutes.  No awakenings.  Out of bed at 6-7 AM.  TST 8-9 hours.  1-hour afternoon nap ~ once weekly.  No unintentional sleep.  No drowsy driving. \par \par INTERIM CHANGES:  27-lb weight loss since started Steglatro for diabetes.  Current stated weight 426-lbs.  Medication list was updated. \par \par EPWORTH SLEEPINESS SCALE\par \par How likely are you to doze off or fall asleep in the situations described below, in contrast to feeling just tired?  This refers to your usual way of life in recent times.  Even if you haven't done some of these things recently, try to work out how they would have affected you.  Use the following scale to choose one most appropriate number for each situation.......Chance of dozin= never. 1= slight. 2= moderate. 3= high.\par \par CHANCE OF DOZING............SITUATION\par 0.............................................Sitting and reading\par 1.............................................Watching TV\par 0.............................................Sitting inactive in a public place (eg a theatre or a meeting)\par 0.............................................As a passenger in a car for an hour without a break\par 1.............................................Lying down to rest in the afternoon when circumstances permit\par 0.............................................Sitting and talking to someone\par 0.............................................Sitting quietly after lunch without alcohol\par 0.............................................In a car, while stopped for a few minutes in traffic\par \par 2............................................TOTAL ESS SCORE\par  [Snoring] : no snoring [Witnessed Apneas] : no witnessed sleep apnea [Frequent Nocturnal Awakening] : no nocturnal awakening [Daytime Somnolence] : no daytime somnolence [Unintentional Sleep while Active] : no unintentional sleep while active [Unintentional Sleep While Inactive] : no unintentional sleep while inactive [Awakes Unrefreshed] : does not awake unrefreshed [Awakes with Headache] : no headache upon awakening [Awakening With Dry Mouth] : no dry mouth upon awakening [Recent  Weight Gain] : no recent weight gain

## 2021-04-05 NOTE — ASSESSMENT
[FreeTextEntry1] : 60 year old LAYLA MCGINNIS with history of atrial fibrillation, hypertension, diabetes, morbid obesity, glaucoma, spinal stenosis; on supplementary oxygen for nocturnal hypoventilation, continues to derive benefit from PAP therapy for severe obstructive sleep apnea since 2014.  \par \par SEVERE ONEL (AHI 29.6) on APAP 10-20 cm H2O using a full-face mask.\par NOCTURNAL HYPOVENTILATION on supplementary nocturnal oxygen at 2 LPM via NC.\par \par With regular use of APAP, patient notes it makes a considerable difference, prior daytime sleepiness and fragmented sleep have resolved.  AHI improved from 29.6 to 0.0.  [place] Sleepiness Scale score today is 2.\par \par Therapy and Compliance Data was reviewed with patient:  Compliant on 100% of nights averaging 8 hours 38 minutes; AHI within normal limits at 0.0 on APAP pressures at 10-20 cmH2O, with excessive mask leak (95% at 31.6 L/min).\par \par Patient aware when unshaven, mask fit is not optimal.  He notes skin irritation when he tightens mask too tight.  He will secure mask + will consider an in-office mask fitting.\par \par [facility] to renew supplies for continuation of APAP and oxygen therapy.  \par Follow-up in one year or sooner if needed.\par

## 2021-04-05 NOTE — REVIEW OF SYSTEMS
[Obesity] : obesity [Diabetes] : diabetes  [Negative] : Neurologic [Recent Wt Loss (___ Lbs)] : recent [unfilled] ~Ulb weight loss [Fatigue] : no fatigue [Nasal Congestion] : no nasal congestion [Postnasal Drip] : no postnasal drip [Shortness Of Breath] : no shortness of breath [Chest Pain] : no chest pain [Palpitations] : no palpitations [Edema] : ~T edema was not present [Thyroid Disease] : no thyroid disease [Anemia] : no anemia [History of Iron Deficiency] : no history of iron deficiency [Heartburn] : no heartburn [Nocturia] : no nocturia [Arthralgias] : no arthralgias [Depression] : no depression [Anxious] : not anxious [Difficulty Initiating Sleep] : no difficulty falling asleep [Difficulty Maintaining Sleep] : no difficulty maintaining sleep [Lower Extremity Discomfort] : no lower extremity discomfort [Unusual Sleep Behavior] : no unusual sleep behavior [FreeTextEntry3] : 14-lb weight loss since last year (440 to 426-lbs)

## 2021-04-22 ENCOUNTER — NON-APPOINTMENT (OUTPATIENT)
Age: 61
End: 2021-04-22

## 2021-04-29 ENCOUNTER — APPOINTMENT (OUTPATIENT)
Dept: ENDOCRINOLOGY | Facility: CLINIC | Age: 61
End: 2021-04-29
Payer: MEDICAID

## 2021-04-29 VITALS
TEMPERATURE: 98.6 F | SYSTOLIC BLOOD PRESSURE: 124 MMHG | DIASTOLIC BLOOD PRESSURE: 82 MMHG | BODY MASS INDEX: 65.53 KG/M2 | RESPIRATION RATE: 16 BRPM | HEART RATE: 61 BPM | OXYGEN SATURATION: 97 % | WEIGHT: 315 LBS

## 2021-04-29 PROCEDURE — 36415 COLL VENOUS BLD VENIPUNCTURE: CPT

## 2021-04-29 PROCEDURE — 99072 ADDL SUPL MATRL&STAF TM PHE: CPT

## 2021-04-29 PROCEDURE — 99214 OFFICE O/P EST MOD 30 MIN: CPT | Mod: 25

## 2021-04-29 RX ORDER — LANCETS 33 GAUGE
EACH MISCELLANEOUS
Qty: 5 | Refills: 2 | Status: ACTIVE | COMMUNITY
Start: 2021-04-29 | End: 1900-01-01

## 2021-04-29 RX ORDER — FLASH GLUCOSE SENSOR
KIT MISCELLANEOUS
Qty: 2 | Refills: 1 | Status: DISCONTINUED | COMMUNITY
Start: 2021-01-22 | End: 2021-04-29

## 2021-04-29 NOTE — HISTORY OF PRESENT ILLNESS
[FreeTextEntry1] : Mr. LAYLA MCGINNIS  is a 61 year old  male who returns with regard to a hx of type 2 dm. The diabetes has been present for about 15   years.. A1c in March was at 7.2%.  He denies any history of retinopathy or nephropathy. + glaucoma. With regard to neuropathy,he denies any neurologic s/s.  . For the diabetes, He   is currently taking Metformin 1000 mg daily and Steglatro 15 mg daily. He denies any c/p , sob or ophthalmologic or neurologic complaints.. He  too denies any skin lesions, skin breakdown or non-healing areas of skin. He too denies any podiatric concerns. Ophthalmologic evaluation is up to date. Home glucose monitoring has shown values to be running    117 to 150s range. Sometimes 170s.  He  does deny any hypoglycemia or hypoglycemic signs or symptoms.\par Additional medical history includes that of a fib, htn, hyperlipidemia ,sleep apnea- f/u wit Dr. Stock. Is taking Eliquis  coreg, amlodipine , losartan  and D3 1,000 iu daily.\par Diet high in carbs\par Now exercising\par Hx of severe sleep apnea-uses cpap\par Now seeing Dr. Nena Rai.

## 2021-05-02 ENCOUNTER — TRANSCRIPTION ENCOUNTER (OUTPATIENT)
Age: 61
End: 2021-05-02

## 2021-06-10 ENCOUNTER — APPOINTMENT (OUTPATIENT)
Dept: ENDOCRINOLOGY | Facility: CLINIC | Age: 61
End: 2021-06-10
Payer: MEDICAID

## 2021-06-10 VITALS
RESPIRATION RATE: 16 BRPM | OXYGEN SATURATION: 98 % | SYSTOLIC BLOOD PRESSURE: 138 MMHG | WEIGHT: 315 LBS | BODY MASS INDEX: 65.53 KG/M2 | DIASTOLIC BLOOD PRESSURE: 80 MMHG | HEART RATE: 94 BPM | TEMPERATURE: 98.6 F

## 2021-06-10 DIAGNOSIS — M79.89 OTHER SPECIFIED SOFT TISSUE DISORDERS: ICD-10-CM

## 2021-06-10 LAB
25(OH)D3 SERPL-MCNC: 40.7 NG/ML
ALBUMIN SERPL ELPH-MCNC: 4.1 G/DL
ALP BLD-CCNC: 76 U/L
ALT SERPL-CCNC: 7 U/L
ANION GAP SERPL CALC-SCNC: 13 MMOL/L
AST SERPL-CCNC: 14 U/L
BILIRUB SERPL-MCNC: 0.5 MG/DL
BUN SERPL-MCNC: 19 MG/DL
CALCIUM SERPL-MCNC: 9.8 MG/DL
CHLORIDE SERPL-SCNC: 101 MMOL/L
CHOLEST SERPL-MCNC: 119 MG/DL
CO2 SERPL-SCNC: 23 MMOL/L
CREAT SERPL-MCNC: 0.95 MG/DL
CREAT SPEC-SCNC: 92 MG/DL
ESTIMATED AVERAGE GLUCOSE: 154 MG/DL
FRUCTOSAMINE SERPL-MCNC: 259 UMOL/L
GLUCOSE BLDC GLUCOMTR-MCNC: 146
GLUCOSE SERPL-MCNC: 140 MG/DL
GLYCOMARK.: 2 UG/ML
HBA1C MFR BLD HPLC: 7 %
HDLC SERPL-MCNC: 29 MG/DL
LDLC SERPL DIRECT ASSAY-MCNC: 71 MG/DL
MICROALBUMIN 24H UR DL<=1MG/L-MCNC: 13.6 MG/DL
MICROALBUMIN/CREAT 24H UR-RTO: 148 MG/G
POTASSIUM SERPL-SCNC: 4.4 MMOL/L
PROT SERPL-MCNC: 7.5 G/DL
SODIUM SERPL-SCNC: 137 MMOL/L
T3FREE SERPL-MCNC: 2.61 PG/ML
T4 FREE SERPL-MCNC: 1.1 NG/DL
TRIGL SERPL-MCNC: 128 MG/DL
TSH SERPL-ACNC: 2.6 UIU/ML
VIT B12 SERPL-MCNC: 414 PG/ML

## 2021-06-10 PROCEDURE — 99072 ADDL SUPL MATRL&STAF TM PHE: CPT

## 2021-06-10 PROCEDURE — 82962 GLUCOSE BLOOD TEST: CPT

## 2021-06-10 PROCEDURE — 99214 OFFICE O/P EST MOD 30 MIN: CPT

## 2021-06-10 NOTE — HISTORY OF PRESENT ILLNESS
[FreeTextEntry1] : Mr. LAYLA MCGINNIS is a 61 year old male who returns with regard to a hx of type 2 dm. The diabetes has been present for about 15 years.. A1c in March was at 7.2%. He denies any history of retinopathy or nephropathy. + glaucoma. With regard to neuropathy,he denies any neurologic s/s. . For the diabetes, He is currently taking Metformin 1000 mg daily and Steglatro 15 mg daily. He denies any c/p , sob or ophthalmologic or neurologic complaints.. He too denies any skin lesions, skin breakdown or non-healing areas of skin. He too denies any podiatric concerns. Ophthalmologic evaluation is up to date. Home glucose monitoring has shown values to be running   140s -160s . He does deny any hypoglycemia or hypoglycemic signs or symptoms.\par 4/29/2021 A1c returned at 7.0%\par POCT glucose returned today at  146  mg/dL \par Glycomark test was low\par Additional medical history includes that of a fib, htn, hyperlipidemia ,sleep apnea- f/u wit Dr. Stock. Is taking Eliquis coreg, amlodipine , losartan and D3 1,000 iu daily.\par Diet high in carbs\par Now exercising\par Hx of severe sleep apnea-uses cpap\par Now seeing Dr. Nena Rai.

## 2021-07-01 RX ORDER — FLUTICASONE PROPIONATE 50 UG/1
50 SPRAY, METERED NASAL TWICE DAILY
Refills: 0 | Status: ACTIVE | COMMUNITY
Start: 2018-11-30

## 2021-10-25 ENCOUNTER — APPOINTMENT (OUTPATIENT)
Dept: ENDOCRINOLOGY | Facility: CLINIC | Age: 61
End: 2021-10-25
Payer: MEDICAID

## 2021-10-25 VITALS
TEMPERATURE: 98.6 F | BODY MASS INDEX: 62.95 KG/M2 | WEIGHT: 315 LBS | SYSTOLIC BLOOD PRESSURE: 120 MMHG | RESPIRATION RATE: 15 BRPM | HEART RATE: 84 BPM | OXYGEN SATURATION: 96 % | DIASTOLIC BLOOD PRESSURE: 74 MMHG

## 2021-10-25 DIAGNOSIS — H40.9 UNSPECIFIED GLAUCOMA: ICD-10-CM

## 2021-10-25 DIAGNOSIS — I10 ESSENTIAL (PRIMARY) HYPERTENSION: ICD-10-CM

## 2021-10-25 LAB
GLUCOSE BLDC GLUCOMTR-MCNC: 133
HBA1C MFR BLD HPLC: 6.7

## 2021-10-25 PROCEDURE — 83036 HEMOGLOBIN GLYCOSYLATED A1C: CPT | Mod: QW

## 2021-10-25 PROCEDURE — 82962 GLUCOSE BLOOD TEST: CPT

## 2021-10-25 PROCEDURE — 99214 OFFICE O/P EST MOD 30 MIN: CPT

## 2021-10-25 NOTE — HISTORY OF PRESENT ILLNESS
[FreeTextEntry1] : Mr. LAYLA MCGINNIS  is a 61 year old  male who returns with regard to a hx of type 2 dm. The diabetes has been present for over 15   years.. A1c last April was at 7.0%.  He denies any history of retiopathy or nephropathy. + glaucoma With regard to neuropathy,he denies any neurologic s/s. For the diabetes, he is currently taking Metformin 1000 mg daily and Steglatro 15 mg daily. He denies any c/p , sob or ophthalmologic or neurologic complaints.. He  too denies any skin lesions, skin breakdown or non-healing areas of skin. He too denies any podiatric concerns. Ophthalmologic evaluation is up to date- does have hx of glaucoma which is stable.  Home glucose monitoring has shown values to be running 110s-140s.   He  does deny any hypoglycemia or hypoglycemic signs or symptoms.\par POCT A1c returned today at 6.7% ; previously 7.0% from 4/29/2021\par POCT glucose returned today at  133  mg/dL \par Additional medical history includes that of a fib, htn, hyperlipidemia ,sleep apnea. Is taking Eliquis  Coreg, amlodipine , losartan  and D3 1,000 iu daily.\par Diet still somewhat high in carbs\par Has lost some weight of late. \par Hx of severe sleep apnea-uses cpap\par

## 2021-11-04 ENCOUNTER — APPOINTMENT (OUTPATIENT)
Dept: INTERNAL MEDICINE | Facility: CLINIC | Age: 61
End: 2021-11-04
Payer: MEDICAID

## 2021-11-04 VITALS
DIASTOLIC BLOOD PRESSURE: 78 MMHG | BODY MASS INDEX: 47.74 KG/M2 | SYSTOLIC BLOOD PRESSURE: 126 MMHG | RESPIRATION RATE: 15 BRPM | WEIGHT: 315 LBS | OXYGEN SATURATION: 96 % | HEIGHT: 68 IN | HEART RATE: 95 BPM | TEMPERATURE: 97.8 F

## 2021-11-04 PROCEDURE — G0008: CPT

## 2021-11-04 PROCEDURE — 36415 COLL VENOUS BLD VENIPUNCTURE: CPT

## 2021-11-04 PROCEDURE — 99213 OFFICE O/P EST LOW 20 MIN: CPT | Mod: 25

## 2021-11-04 PROCEDURE — 90686 IIV4 VACC NO PRSV 0.5 ML IM: CPT

## 2021-11-05 LAB — PSA SERPL-MCNC: 0.98 NG/ML

## 2021-11-08 LAB
25(OH)D3 SERPL-MCNC: 43.1 NG/ML
ALBUMIN SERPL ELPH-MCNC: 3.9 G/DL
ALP BLD-CCNC: 84 U/L
ALT SERPL-CCNC: 10 U/L
ANION GAP SERPL CALC-SCNC: 14 MMOL/L
AST SERPL-CCNC: 20 U/L
BASOPHILS # BLD AUTO: 0.06 K/UL
BASOPHILS NFR BLD AUTO: 0.7 %
BILIRUB SERPL-MCNC: 0.6 MG/DL
BUN SERPL-MCNC: 15 MG/DL
CALCIUM SERPL-MCNC: 9.8 MG/DL
CHLORIDE SERPL-SCNC: 102 MMOL/L
CHOLEST SERPL-MCNC: 136 MG/DL
CO2 SERPL-SCNC: 22 MMOL/L
CREAT SERPL-MCNC: 0.96 MG/DL
CREAT SPEC-SCNC: 124 MG/DL
EOSINOPHIL # BLD AUTO: 0.15 K/UL
EOSINOPHIL NFR BLD AUTO: 1.9 %
ESTIMATED AVERAGE GLUCOSE: 163 MG/DL
FRUCTOSAMINE SERPL-MCNC: 257 UMOL/L
GLUCOSE SERPL-MCNC: 129 MG/DL
GLYCOMARK.: 2 UG/ML
HBA1C MFR BLD HPLC: 7.3 %
HCT VFR BLD CALC: 40.8 %
HDLC SERPL-MCNC: 32 MG/DL
HGB BLD-MCNC: 13 G/DL
IMM GRANULOCYTES NFR BLD AUTO: 0.4 %
LDLC SERPL DIRECT ASSAY-MCNC: 80 MG/DL
LYMPHOCYTES # BLD AUTO: 1.53 K/UL
LYMPHOCYTES NFR BLD AUTO: 18.9 %
MAGNESIUM SERPL-MCNC: 2.2 MG/DL
MAN DIFF?: NORMAL
MCHC RBC-ENTMCNC: 28.3 PG
MCHC RBC-ENTMCNC: 31.9 GM/DL
MCV RBC AUTO: 88.7 FL
MICROALBUMIN 24H UR DL<=1MG/L-MCNC: 23.2 MG/DL
MICROALBUMIN/CREAT 24H UR-RTO: 187 MG/G
MONOCYTES # BLD AUTO: 0.66 K/UL
MONOCYTES NFR BLD AUTO: 8.1 %
NEUTROPHILS # BLD AUTO: 5.67 K/UL
NEUTROPHILS NFR BLD AUTO: 70 %
PLATELET # BLD AUTO: 217 K/UL
POTASSIUM SERPL-SCNC: 4.3 MMOL/L
PROT SERPL-MCNC: 7.4 G/DL
RBC # BLD: 4.6 M/UL
RBC # FLD: 15.3 %
SODIUM SERPL-SCNC: 138 MMOL/L
T3FREE SERPL-MCNC: 2.51 PG/ML
T4 FREE SERPL-MCNC: 1.2 NG/DL
TRIGL SERPL-MCNC: 118 MG/DL
TSH SERPL-ACNC: 3.06 UIU/ML
VIT B12 SERPL-MCNC: 375 PG/ML
WBC # FLD AUTO: 8.1 K/UL

## 2022-02-14 ENCOUNTER — RX RENEWAL (OUTPATIENT)
Age: 62
End: 2022-02-14

## 2022-02-25 ENCOUNTER — APPOINTMENT (OUTPATIENT)
Dept: ENDOCRINOLOGY | Facility: CLINIC | Age: 62
End: 2022-02-25
Payer: MEDICAID

## 2022-02-25 VITALS
HEART RATE: 98 BPM | SYSTOLIC BLOOD PRESSURE: 118 MMHG | RESPIRATION RATE: 15 BRPM | TEMPERATURE: 98.6 F | DIASTOLIC BLOOD PRESSURE: 74 MMHG | OXYGEN SATURATION: 99 %

## 2022-02-25 LAB — HBA1C MFR BLD HPLC: 7.4

## 2022-02-25 PROCEDURE — 82962 GLUCOSE BLOOD TEST: CPT

## 2022-02-25 PROCEDURE — 99214 OFFICE O/P EST MOD 30 MIN: CPT

## 2022-02-25 PROCEDURE — 83036 HEMOGLOBIN GLYCOSYLATED A1C: CPT | Mod: QW

## 2022-02-25 RX ORDER — ERTUGLIFLOZIN 15 MG/1
15 TABLET, FILM COATED ORAL
Qty: 90 | Refills: 3 | Status: DISCONTINUED | COMMUNITY
Start: 2021-02-05 | End: 2022-02-25

## 2022-02-25 RX ORDER — METFORMIN HYDROCHLORIDE 1000 MG/1
1000 TABLET, COATED ORAL DAILY
Qty: 90 | Refills: 3 | Status: DISCONTINUED | COMMUNITY
Start: 2021-02-05 | End: 2022-02-25

## 2022-02-25 NOTE — HISTORY OF PRESENT ILLNESS
[FreeTextEntry1] : Mr. LAYLA MCGINNIS  is a 61 year old  male who returns with regard to a hx of type 2 dm. The diabetes has been present for over 15   years.. .  He denies any history of retiopathy or nephropathy. + glaucoma With regard to neuropathy,he denies any neurologic s/s. For the diabetes, he is currently taking Metformin 1000 mg daily and Steglatro 15 mg daily. New insurance now covers Synjardy and not Steglatro. He denies any c/p , sob or ophthalmologic or neurologic complaints.. He  too denies any skin lesions, skin breakdown or non-healing areas of skin. He too denies any podiatric concerns. Ophthalmologic evaluation is up to date. Home glucose monitoring has shown values to be running  in 130-140 range. Not testing much lately.He  does deny any hypoglycemia or hypoglycemic signs or symptoms.\par Additional medical history includes that of a fib, htn, hyperlipidemia ,sleep apnea. Is taking Eliquis  Coreg, amlodipine , losartan  and D3 1,000 iu daily.\par Diet still somewhat high in carbs-especially around holidays.\par Is exercising\par Hx of severe sleep apnea-uses cpap  Pulm -Dr. Stock\par \par May have had covid very early on-not tested.\par

## 2022-03-28 ENCOUNTER — NON-APPOINTMENT (OUTPATIENT)
Age: 62
End: 2022-03-28

## 2022-04-03 ENCOUNTER — RX RENEWAL (OUTPATIENT)
Age: 62
End: 2022-04-03

## 2022-04-11 ENCOUNTER — APPOINTMENT (OUTPATIENT)
Dept: INTERNAL MEDICINE | Facility: CLINIC | Age: 62
End: 2022-04-11
Payer: MEDICAID

## 2022-04-11 ENCOUNTER — APPOINTMENT (OUTPATIENT)
Dept: PULMONOLOGY | Facility: CLINIC | Age: 62
End: 2022-04-11
Payer: MEDICAID

## 2022-04-11 VITALS
TEMPERATURE: 97.2 F | HEART RATE: 82 BPM | WEIGHT: 315 LBS | HEIGHT: 68 IN | DIASTOLIC BLOOD PRESSURE: 72 MMHG | BODY MASS INDEX: 47.74 KG/M2 | OXYGEN SATURATION: 77 % | SYSTOLIC BLOOD PRESSURE: 134 MMHG

## 2022-04-11 VITALS
HEIGHT: 68 IN | SYSTOLIC BLOOD PRESSURE: 117 MMHG | TEMPERATURE: 97.7 F | DIASTOLIC BLOOD PRESSURE: 75 MMHG | HEART RATE: 89 BPM | RESPIRATION RATE: 16 BRPM | WEIGHT: 315 LBS | BODY MASS INDEX: 47.74 KG/M2

## 2022-04-11 DIAGNOSIS — R10.31 RIGHT LOWER QUADRANT PAIN: ICD-10-CM

## 2022-04-11 PROCEDURE — 99212 OFFICE O/P EST SF 10 MIN: CPT

## 2022-04-11 PROCEDURE — 99213 OFFICE O/P EST LOW 20 MIN: CPT

## 2022-04-11 NOTE — HISTORY OF PRESENT ILLNESS
[To Bed: ___] : ~he/she~ goes to bed at [unfilled] [Arises: ___] : arises at [unfilled] [Sleep Onset Latency: ___ minutes] : sleep onset latency of [unfilled] minutes reported [Nocturnal Awakenings: ___] : ~he/she~ typically has [unfilled] nocturnal awakenings [TST: ___] : Total sleep time is [unfilled] [Daytime Sleep: ___] : daytime sleep: [unfilled] [Snoring] : no snoring [FreeTextEntry1] : 61 year old LAYLA MCGINNIS on annual visit to continue PAP therapy for obstructive sleep apnea since 2014.\par ·	PMH:  Atrial Fibrillation, HTN, HLD, DM, morbid obesity, glaucoma, spinal stenosis.\par \par SEVERE ONEL (AHI 29.6) on APAP 10-20 cmH2O using a full-face mask.\par \par ·	2014 apnea signs + symptoms: BMI 58, snoring, fatigue. Diagnosed with severe ONEL (AHI 77.4).\par ·	Last DX PSG 2/7/2019:  AHI 29.6. T90 54.1%.  Lowest SaO2 76%.\par ·	TITRATION 4/14/2019:  AHI 1.4 on CPAP 10 cmH2O. F&P Simplus L FFM. \par ·	TX:  AirSense 10 AutoSet set-up 6/21/2019 by Steph.\par ·	COMPLIANCE Data: 100% of days, 100% for min.4hrs, avg 8 hrs. 33 mins\par ·	THERAPY Data:  AHI 0.0.  Pressures 10-20 (avg 13.0) cmH2O.  Average leak 16.0 L/m.\par \par PATIENT REPORTS\par With CPAP use for the entire duration of sleep (at least 8-hours), and when travelling, he notes an increase in daytime alertness.  He tolerates therapy and mask well.  Inquired about [facility] therapy.\par Change in insurance.  Change in diabetes medication (to synjardy from metformin).  No interim changes in health, weight, or sleep.   [Witnessed Apneas] : no witnessed sleep apnea [Frequent Nocturnal Awakening] : no nocturnal awakening [Unintentional Sleep while Active] : no unintentional sleep while active [Unintentional Sleep While Inactive] : no unintentional sleep while inactive [Awakes Unrefreshed] : does not awake unrefreshed [Awakes with Headache] : no headache upon awakening [Awakening With Dry Mouth] : no dry mouth upon awakening [Recent  Weight Gain] : no recent weight gain [Daytime Somnolence] : no daytime somnolence [ESS] : 5

## 2022-04-11 NOTE — ASSESSMENT
[FreeTextEntry1] : 61 year old LAYLA MCGINNIS continues to derive benefit from PAP therapy for obstructive sleep apnea since 2014.\par \par SEVERE ONEL (AHI 29.6) on APAP 10-20 cmH2O using a full-face mask.\par .\par ·	Increase in daytime alertness. Prior snoring and fatigue have resolved.  \par ·	[place] Sleepiness Scale score is 5. \par ·	Excellent compliance (100% for 8.5 hrs), excellent therapeutic response (AHI 0.0).    \par \par Reviewed Insp[facility] Therapy with patient.  He will continue PAP therapy.\par \par PLAN:\par ·	Renewal of supplies from [facility] to continue therapy.\par ·	Follow-up annually or sooner if needed.\par \par \par \par \par \par

## 2022-04-11 NOTE — REVIEW OF SYSTEMS
[Obesity] : obesity [Diabetes] : diabetes  [Negative] : Psychiatric [Fatigue] : no fatigue [Recent Wt Loss (___ Lbs)] : no recent weight loss [Thyroid Disease] : no thyroid disease [Difficulty Initiating Sleep] : no difficulty falling asleep [Difficulty Maintaining Sleep] : no difficulty maintaining sleep [Irresistible urge to move legs] : no irresistible urge to move legs because of lower extremity discomfort [Unusual Sleep Behavior] : no unusual sleep behavior

## 2022-04-11 NOTE — PHYSICAL EXAM
[Normal Appearance] : normal appearance [Well Groomed] : well groomed [General Appearance - In No Acute Distress] : no acute distress [Normal Conjunctiva] : the conjunctiva exhibited no abnormalities [Neck Appearance] : the appearance of the neck was normal [Irregularly Irregular] : the rhythm was irregularly irregular [] : no respiratory distress [Auscultation Breath Sounds / Voice Sounds] : lungs were clear to auscultation bilaterally [Involuntary Movements] : no involuntary movements were seen [Non-Pitting] : non-pitting [No Focal Deficits] : no focal deficits [Affect] : the affect was normal [Mood] : the mood was normal [FreeTextEntry1] : utilizing a cane.

## 2022-04-15 ENCOUNTER — NON-APPOINTMENT (OUTPATIENT)
Age: 62
End: 2022-04-15

## 2022-04-19 ENCOUNTER — RX RENEWAL (OUTPATIENT)
Age: 62
End: 2022-04-19

## 2022-04-20 ENCOUNTER — TRANSCRIPTION ENCOUNTER (OUTPATIENT)
Age: 62
End: 2022-04-20

## 2022-05-04 ENCOUNTER — APPOINTMENT (OUTPATIENT)
Dept: ENDOCRINOLOGY | Facility: CLINIC | Age: 62
End: 2022-05-04
Payer: MEDICAID

## 2022-05-04 ENCOUNTER — RESULT CHARGE (OUTPATIENT)
Age: 62
End: 2022-05-04

## 2022-05-04 VITALS
SYSTOLIC BLOOD PRESSURE: 132 MMHG | TEMPERATURE: 97.9 F | OXYGEN SATURATION: 96 % | HEIGHT: 68 IN | BODY MASS INDEX: 47.74 KG/M2 | HEART RATE: 94 BPM | WEIGHT: 315 LBS | DIASTOLIC BLOOD PRESSURE: 82 MMHG

## 2022-05-04 LAB
GLUCOSE BLDC GLUCOMTR-MCNC: 147
HBA1C MFR BLD HPLC: 7.8

## 2022-05-04 PROCEDURE — 83036 HEMOGLOBIN GLYCOSYLATED A1C: CPT | Mod: QW

## 2022-05-04 PROCEDURE — 99214 OFFICE O/P EST MOD 30 MIN: CPT

## 2022-05-04 NOTE — HISTORY OF PRESENT ILLNESS
[FreeTextEntry1] : Mr. LAYLA MCGINNIS  is a 62 year old  male who returns with regard to a hx of type 2 dm. The diabetes has been present for over 15   years. He denies any history of retinopathy or nephropathy. + glaucoma With regard to neuropathy,he denies any neurologic s/s. \par \par For the diabetes, he is currently taking  Synjardy  12.5/100 mg bid. He denies any c/p , sob or ophthalmologic or neurologic complaints.. He  too denies any skin lesions, skin breakdown or non-healing areas of skin. He too denies any podiatric concerns. Ophthalmologic evaluation is up to date. Home glucose monitoring has shown values to be running in 140-160 range. Not testing much lately.He  does deny any hypoglycemia or hypoglycemic signs or symptoms.\par POCT A1c returned today at 7.8% ; previously 7.4% on 02/25/2022.\par POCT glucose returned today at 147 mg/dL \par Additional medical history includes that of a fib, htn, hyperlipidemia ,sleep apnea. Is taking Eliquis  Coreg, amlodipine , losartan  and D3 1,000 iu daily.\par Diet still somewhat high in carbs\par Is exercising\par Hx of severe sleep apnea-uses cpap  Pulm -Dr. Stock\par \par May have had covid very early on-not tested.

## 2022-05-05 ENCOUNTER — NON-APPOINTMENT (OUTPATIENT)
Age: 62
End: 2022-05-05

## 2022-05-05 LAB
25(OH)D3 SERPL-MCNC: 44.3 NG/ML
ALBUMIN SERPL ELPH-MCNC: 4.1 G/DL
ALP BLD-CCNC: 86 U/L
ALT SERPL-CCNC: 14 U/L
ANION GAP SERPL CALC-SCNC: 14 MMOL/L
AST SERPL-CCNC: 24 U/L
BASOPHILS # BLD AUTO: 0.04 K/UL
BASOPHILS NFR BLD AUTO: 0.5 %
BILIRUB SERPL-MCNC: 0.5 MG/DL
BUN SERPL-MCNC: 17 MG/DL
CALCIUM SERPL-MCNC: 9.9 MG/DL
CHLORIDE SERPL-SCNC: 101 MMOL/L
CHOLEST SERPL-MCNC: 133 MG/DL
CO2 SERPL-SCNC: 24 MMOL/L
CREAT SERPL-MCNC: 0.99 MG/DL
CREAT SPEC-SCNC: 86 MG/DL
EGFR: 86 ML/MIN/1.73M2
EOSINOPHIL # BLD AUTO: 0.18 K/UL
EOSINOPHIL NFR BLD AUTO: 2.3 %
ESTIMATED AVERAGE GLUCOSE: 177 MG/DL
FRUCTOSAMINE SERPL-MCNC: 283 UMOL/L
GLUCOSE SERPL-MCNC: 133 MG/DL
GLYCOMARK.: 1.5 UG/ML
HBA1C MFR BLD HPLC: 7.8 %
HCT VFR BLD CALC: 41.5 %
HDLC SERPL-MCNC: 32 MG/DL
HGB BLD-MCNC: 13 G/DL
IMM GRANULOCYTES NFR BLD AUTO: 0.1 %
LDLC SERPL DIRECT ASSAY-MCNC: 76 MG/DL
LYMPHOCYTES # BLD AUTO: 1.61 K/UL
LYMPHOCYTES NFR BLD AUTO: 20.8 %
MAN DIFF?: NORMAL
MCHC RBC-ENTMCNC: 28 PG
MCHC RBC-ENTMCNC: 31.3 GM/DL
MCV RBC AUTO: 89.2 FL
MICROALBUMIN 24H UR DL<=1MG/L-MCNC: 20.6 MG/DL
MICROALBUMIN/CREAT 24H UR-RTO: 238 MG/G
MONOCYTES # BLD AUTO: 0.64 K/UL
MONOCYTES NFR BLD AUTO: 8.3 %
NEUTROPHILS # BLD AUTO: 5.26 K/UL
NEUTROPHILS NFR BLD AUTO: 68 %
PLATELET # BLD AUTO: 220 K/UL
POTASSIUM SERPL-SCNC: 4.7 MMOL/L
PROT SERPL-MCNC: 7.7 G/DL
RBC # BLD: 4.65 M/UL
RBC # FLD: 14.5 %
SODIUM SERPL-SCNC: 139 MMOL/L
T3FREE SERPL-MCNC: 2.65 PG/ML
T4 FREE SERPL-MCNC: 1.1 NG/DL
TRIGL SERPL-MCNC: 124 MG/DL
TSH SERPL-ACNC: 3.78 UIU/ML
WBC # FLD AUTO: 7.74 K/UL

## 2022-05-05 RX ORDER — ALOGLIPTIN 25 MG/1
25 TABLET, FILM COATED ORAL DAILY
Qty: 90 | Refills: 2 | Status: DISCONTINUED | COMMUNITY
Start: 2022-05-04 | End: 2022-05-05

## 2022-06-07 ENCOUNTER — NON-APPOINTMENT (OUTPATIENT)
Age: 62
End: 2022-06-07

## 2022-06-10 ENCOUNTER — RX RENEWAL (OUTPATIENT)
Age: 62
End: 2022-06-10

## 2022-06-13 ENCOUNTER — NON-APPOINTMENT (OUTPATIENT)
Age: 62
End: 2022-06-13

## 2022-07-11 ENCOUNTER — APPOINTMENT (OUTPATIENT)
Dept: INTERNAL MEDICINE | Facility: CLINIC | Age: 62
End: 2022-07-11

## 2022-07-11 ENCOUNTER — NON-APPOINTMENT (OUTPATIENT)
Age: 62
End: 2022-07-11

## 2022-07-11 VITALS
SYSTOLIC BLOOD PRESSURE: 124 MMHG | WEIGHT: 315 LBS | HEART RATE: 120 BPM | TEMPERATURE: 98.1 F | BODY MASS INDEX: 47.74 KG/M2 | DIASTOLIC BLOOD PRESSURE: 89 MMHG | HEIGHT: 68 IN | OXYGEN SATURATION: 98 %

## 2022-07-11 PROCEDURE — G0444 DEPRESSION SCREEN ANNUAL: CPT | Mod: 59

## 2022-07-11 PROCEDURE — 93000 ELECTROCARDIOGRAM COMPLETE: CPT | Mod: 59

## 2022-07-11 PROCEDURE — 99396 PREV VISIT EST AGE 40-64: CPT | Mod: 25

## 2022-07-11 PROCEDURE — 36415 COLL VENOUS BLD VENIPUNCTURE: CPT

## 2022-07-11 RX ORDER — LOSARTAN POTASSIUM 25 MG/1
25 TABLET, FILM COATED ORAL
Qty: 90 | Refills: 3 | Status: DISCONTINUED | COMMUNITY
Start: 2018-04-25 | End: 2022-07-11

## 2022-07-12 ENCOUNTER — APPOINTMENT (OUTPATIENT)
Dept: ENDOCRINOLOGY | Facility: CLINIC | Age: 62
End: 2022-07-12

## 2022-07-12 VITALS
SYSTOLIC BLOOD PRESSURE: 134 MMHG | RESPIRATION RATE: 15 BRPM | TEMPERATURE: 98.6 F | HEART RATE: 92 BPM | OXYGEN SATURATION: 96 % | DIASTOLIC BLOOD PRESSURE: 72 MMHG

## 2022-07-12 LAB
25(OH)D3 SERPL-MCNC: 41.1 NG/ML
ALBUMIN SERPL ELPH-MCNC: 4.4 G/DL
ALP BLD-CCNC: 87 U/L
ALT SERPL-CCNC: 13 U/L
ANION GAP SERPL CALC-SCNC: 12 MMOL/L
AST SERPL-CCNC: 19 U/L
BASOPHILS # BLD AUTO: 0.04 K/UL
BASOPHILS NFR BLD AUTO: 0.6 %
BILIRUB SERPL-MCNC: 0.6 MG/DL
BUN SERPL-MCNC: 15 MG/DL
CALCIUM SERPL-MCNC: 9.1 MG/DL
CHLORIDE SERPL-SCNC: 102 MMOL/L
CHOLEST SERPL-MCNC: 130 MG/DL
CO2 SERPL-SCNC: 25 MMOL/L
CREAT SERPL-MCNC: 0.97 MG/DL
CREAT SPEC-SCNC: 81 MG/DL
EGFR: 88 ML/MIN/1.73M2
EOSINOPHIL # BLD AUTO: 0.22 K/UL
EOSINOPHIL NFR BLD AUTO: 3.2 %
ESTIMATED AVERAGE GLUCOSE: 169 MG/DL
GLUCOSE BLDC GLUCOMTR-MCNC: 134
GLUCOSE SERPL-MCNC: 145 MG/DL
HBA1C MFR BLD HPLC: 7.5 %
HCT VFR BLD CALC: 41.2 %
HDLC SERPL-MCNC: 36 MG/DL
HGB BLD-MCNC: 12.9 G/DL
IMM GRANULOCYTES NFR BLD AUTO: 0.3 %
LDLC SERPL CALC-MCNC: 73 MG/DL
LYMPHOCYTES # BLD AUTO: 1.24 K/UL
LYMPHOCYTES NFR BLD AUTO: 18.3 %
MAN DIFF?: NORMAL
MCHC RBC-ENTMCNC: 27.5 PG
MCHC RBC-ENTMCNC: 31.3 GM/DL
MCV RBC AUTO: 87.8 FL
MICROALBUMIN 24H UR DL<=1MG/L-MCNC: 17.5 MG/DL
MICROALBUMIN/CREAT 24H UR-RTO: 216 MG/G
MONOCYTES # BLD AUTO: 0.54 K/UL
MONOCYTES NFR BLD AUTO: 8 %
NEUTROPHILS # BLD AUTO: 4.71 K/UL
NEUTROPHILS NFR BLD AUTO: 69.6 %
NONHDLC SERPL-MCNC: 94 MG/DL
PLATELET # BLD AUTO: 208 K/UL
POTASSIUM SERPL-SCNC: 4.6 MMOL/L
PROT SERPL-MCNC: 7.2 G/DL
PSA SERPL-MCNC: 0.86 NG/ML
RBC # BLD: 4.69 M/UL
RBC # FLD: 15.5 %
SODIUM SERPL-SCNC: 139 MMOL/L
TRIGL SERPL-MCNC: 106 MG/DL
TSH SERPL-ACNC: 3.52 UIU/ML
WBC # FLD AUTO: 6.77 K/UL

## 2022-07-12 PROCEDURE — 99214 OFFICE O/P EST MOD 30 MIN: CPT

## 2022-07-12 PROCEDURE — 82962 GLUCOSE BLOOD TEST: CPT

## 2022-07-12 NOTE — HISTORY OF PRESENT ILLNESS
[FreeTextEntry1] : Mr. LAYLA MCGINNIS  is a 62 year old  male who returns with regard to a hx of type 2 dm. The diabetes has been present for over 15   years.  He denies any history of retinopathy or nephropathy. + glaucoma With regard to neuropathy,he denies any neurologic s/s. \par \par For the diabetes, he is currently taking  Synjardy  12.5/1000 mg once a day.  He denies any c/p , sob or ophthalmologic or neurologic complaints.. He  too denies any skin lesions, skin breakdown or non-healing areas of skin. He too denies any podiatric concerns. Ophthalmologic evaluation is up to date.\par \par Recent A1C done by Dr. Rai 7.5% prior was 7.8% \par POCT glucose returned today at 134mg/dL \par \par  Home glucose monitoring has shown values to be running  in 120-160 in the morning. He  does deny any hypoglycemia or hypoglycemic signs or symptoms.\par \par Additional medical history includes that of a fib, htn, hyperlipidemia ,sleep apnea. Is taking Eliquis  Coreg, amlodipine , losartan  and D3 1,000 iu daily.\par Diet still somewhat high in carbs\par Is exercising\par \par Hx of severe sleep apnea-uses cpap  Pulm -Dr. Stock\par \par May have had covid very early on-not tested.\par

## 2022-07-20 ENCOUNTER — RX RENEWAL (OUTPATIENT)
Age: 62
End: 2022-07-20

## 2022-07-21 ENCOUNTER — RX RENEWAL (OUTPATIENT)
Age: 62
End: 2022-07-21

## 2022-08-03 ENCOUNTER — TRANSCRIPTION ENCOUNTER (OUTPATIENT)
Age: 62
End: 2022-08-03

## 2022-08-05 ENCOUNTER — TRANSCRIPTION ENCOUNTER (OUTPATIENT)
Age: 62
End: 2022-08-05

## 2022-08-22 RX ORDER — LANCETS
EACH MISCELLANEOUS
Qty: 2 | Refills: 3 | Status: DISCONTINUED | COMMUNITY
Start: 2022-03-24 | End: 2022-08-22

## 2022-08-25 ENCOUNTER — TRANSCRIPTION ENCOUNTER (OUTPATIENT)
Age: 62
End: 2022-08-25

## 2022-09-07 ENCOUNTER — RX RENEWAL (OUTPATIENT)
Age: 62
End: 2022-09-07

## 2022-10-02 ENCOUNTER — RX RENEWAL (OUTPATIENT)
Age: 62
End: 2022-10-02

## 2022-10-02 ENCOUNTER — TRANSCRIPTION ENCOUNTER (OUTPATIENT)
Age: 62
End: 2022-10-02

## 2022-10-03 ENCOUNTER — TRANSCRIPTION ENCOUNTER (OUTPATIENT)
Age: 62
End: 2022-10-03

## 2022-10-20 ENCOUNTER — TRANSCRIPTION ENCOUNTER (OUTPATIENT)
Age: 62
End: 2022-10-20

## 2022-10-21 ENCOUNTER — RX RENEWAL (OUTPATIENT)
Age: 62
End: 2022-10-21

## 2022-10-21 ENCOUNTER — TRANSCRIPTION ENCOUNTER (OUTPATIENT)
Age: 62
End: 2022-10-21

## 2022-11-01 ENCOUNTER — TRANSCRIPTION ENCOUNTER (OUTPATIENT)
Age: 62
End: 2022-11-01

## 2022-11-01 ENCOUNTER — APPOINTMENT (OUTPATIENT)
Dept: ENDOCRINOLOGY | Facility: CLINIC | Age: 62
End: 2022-11-01

## 2022-11-01 VITALS
HEART RATE: 83 BPM | BODY MASS INDEX: 47.74 KG/M2 | DIASTOLIC BLOOD PRESSURE: 82 MMHG | HEIGHT: 68 IN | OXYGEN SATURATION: 96 % | TEMPERATURE: 98.4 F | WEIGHT: 315 LBS | SYSTOLIC BLOOD PRESSURE: 123 MMHG

## 2022-11-01 DIAGNOSIS — E66.01 MORBID (SEVERE) OBESITY DUE TO EXCESS CALORIES: ICD-10-CM

## 2022-11-01 LAB
GLUCOSE BLDC GLUCOMTR-MCNC: 143
HBA1C MFR BLD HPLC: 7.2

## 2022-11-01 PROCEDURE — 83036 HEMOGLOBIN GLYCOSYLATED A1C: CPT | Mod: QW

## 2022-11-01 PROCEDURE — 82962 GLUCOSE BLOOD TEST: CPT

## 2022-11-01 PROCEDURE — 99214 OFFICE O/P EST MOD 30 MIN: CPT

## 2022-11-01 RX ORDER — BLOOD SUGAR DIAGNOSTIC
STRIP MISCELLANEOUS
Qty: 2 | Refills: 3 | Status: ACTIVE | COMMUNITY
Start: 2022-03-28 | End: 1900-01-01

## 2022-11-01 RX ORDER — BLOOD-GLUCOSE METER
W/DEVICE EACH MISCELLANEOUS
Qty: 1 | Refills: 0 | Status: ACTIVE | COMMUNITY
Start: 2022-03-28 | End: 1900-01-01

## 2022-11-01 RX ORDER — LANCETS 33 GAUGE
EACH MISCELLANEOUS
Qty: 2 | Refills: 3 | Status: ACTIVE | COMMUNITY
Start: 2022-03-28 | End: 1900-01-01

## 2022-11-03 ENCOUNTER — NON-APPOINTMENT (OUTPATIENT)
Age: 62
End: 2022-11-03

## 2022-11-05 NOTE — HISTORY OF PRESENT ILLNESS
[FreeTextEntry1] : Mr. LAYLA MCGINNIS  is a 62 year old  male who returns with regard to a hx of type 2 dm. The diabetes has been present for over 15   years.  He denies any history of retiopathy or nephropathy. + glaucoma With regard to neuropathy,he denies any neurologic s/s. \par \par For the diabetes, he is currently taking  Synjardy  12.5/100 mg bid and Metformin ER 500mg. He denies any c/p , sob or ophthalmologic or neurologic complaints. He  too denies any skin lesions, skin breakdown or non-healing areas of skin. He too denies any podiatric concerns. Ophthalmologic evaluation is up to date. No diabetic retinopathy noted. \par \par Patient has not been carrying out home glucose monitoring of late. He does deny any hypoglycemia or hypoglycemic signs or symptoms.\par \par POCT A1c returned today at 7.2% ; previously 7.5% in 7/12/22. \par POCT glucose returned today at 143 mg/dL \par \par Additional medical history includes that of a fib, htn, hyperlipidemia ,sleep apnea. Is taking Eliquis  Coreg, amlodipine , losartan  and D3 1,000 iu daily.\par Diet still somewhat high in carbs\par Is exercising\par \par Hx of severe sleep apnea-uses cpap  Pulm -Dr. Stock\par \par May have had covid very early on-not tested.\par

## 2022-12-19 ENCOUNTER — RX RENEWAL (OUTPATIENT)
Age: 62
End: 2022-12-19

## 2023-01-03 ENCOUNTER — RX RENEWAL (OUTPATIENT)
Age: 63
End: 2023-01-03

## 2023-01-04 ENCOUNTER — RX RENEWAL (OUTPATIENT)
Age: 63
End: 2023-01-04

## 2023-01-20 ENCOUNTER — TRANSCRIPTION ENCOUNTER (OUTPATIENT)
Age: 63
End: 2023-01-20

## 2023-01-26 ENCOUNTER — TRANSCRIPTION ENCOUNTER (OUTPATIENT)
Age: 63
End: 2023-01-26

## 2023-01-27 ENCOUNTER — TRANSCRIPTION ENCOUNTER (OUTPATIENT)
Age: 63
End: 2023-01-27

## 2023-02-02 ENCOUNTER — APPOINTMENT (OUTPATIENT)
Dept: ENDOCRINOLOGY | Facility: CLINIC | Age: 63
End: 2023-02-02
Payer: MEDICAID

## 2023-02-02 VITALS
DIASTOLIC BLOOD PRESSURE: 90 MMHG | BODY MASS INDEX: 47.74 KG/M2 | SYSTOLIC BLOOD PRESSURE: 150 MMHG | HEART RATE: 84 BPM | HEIGHT: 68 IN | TEMPERATURE: 97.7 F | WEIGHT: 315 LBS | OXYGEN SATURATION: 94 %

## 2023-02-02 VITALS — DIASTOLIC BLOOD PRESSURE: 82 MMHG | SYSTOLIC BLOOD PRESSURE: 144 MMHG

## 2023-02-02 LAB
GLUCOSE BLDC GLUCOMTR-MCNC: 158
HBA1C MFR BLD HPLC: 8

## 2023-02-02 PROCEDURE — 99214 OFFICE O/P EST MOD 30 MIN: CPT

## 2023-02-02 PROCEDURE — 82962 GLUCOSE BLOOD TEST: CPT

## 2023-02-02 PROCEDURE — 83036 HEMOGLOBIN GLYCOSYLATED A1C: CPT | Mod: QW

## 2023-02-03 DIAGNOSIS — E87.5 HYPERKALEMIA: ICD-10-CM

## 2023-02-03 LAB
25(OH)D3 SERPL-MCNC: 38.6 NG/ML
ALBUMIN SERPL ELPH-MCNC: 4.2 G/DL
ALP BLD-CCNC: 87 U/L
ALT SERPL-CCNC: 14 U/L
ANION GAP SERPL CALC-SCNC: 14 MMOL/L
AST SERPL-CCNC: 15 U/L
BILIRUB SERPL-MCNC: 0.5 MG/DL
BUN SERPL-MCNC: 22 MG/DL
CALCIUM SERPL-MCNC: 9.7 MG/DL
CHLORIDE SERPL-SCNC: 100 MMOL/L
CHOLEST SERPL-MCNC: 132 MG/DL
CO2 SERPL-SCNC: 27 MMOL/L
CREAT SERPL-MCNC: 1.06 MG/DL
CREAT SPEC-SCNC: 77 MG/DL
EGFR: 79 ML/MIN/1.73M2
ESTIMATED AVERAGE GLUCOSE: 169 MG/DL
FRUCTOSAMINE SERPL-MCNC: 273 UMOL/L
GLUCOSE SERPL-MCNC: 150 MG/DL
GLYCOMARK.: 2.3 UG/ML
HBA1C MFR BLD HPLC: 7.5 %
HDLC SERPL-MCNC: 34 MG/DL
LDLC SERPL DIRECT ASSAY-MCNC: 72 MG/DL
MAGNESIUM SERPL-MCNC: 2.2 MG/DL
MAGNESIUM SERPL-MCNC: 2.2 MG/DL
MICROALBUMIN 24H UR DL<=1MG/L-MCNC: 9.8 MG/DL
MICROALBUMIN/CREAT 24H UR-RTO: 127 MG/G
POTASSIUM SERPL-SCNC: 4.3 MMOL/L
POTASSIUM SERPL-SCNC: 5.6 MMOL/L
PROT SERPL-MCNC: 6.6 G/DL
SODIUM SERPL-SCNC: 140 MMOL/L
T3FREE SERPL-MCNC: 3.11 PG/ML
T4 FREE SERPL-MCNC: 1.2 NG/DL
TRIGL SERPL-MCNC: 135 MG/DL
TSH SERPL-ACNC: 2.97 UIU/ML

## 2023-02-05 NOTE — HISTORY OF PRESENT ILLNESS
[FreeTextEntry1] : Mr. LAYLA MCGINNIS  is a 62 year old  male who returns with regard to a hx of type 2 dm. The diabetes has been present for over 15   years. He denies any history of retinopathy or nephropathy. + glaucoma With regard to neuropathy, he denies any neurologic s/s. \par \par For the diabetes,he is currently taking Synjardy XR 12.5/1000 mg once daily and Metformin  mg. Too, was to be on Januvia 100 mg daily, however he has not been taking.  He denies any c/p,  sob or ophthalmologic or neurologic complaints.He  too denies any skin lesions, skin breakdown or non-healing areas of skin. He too denies any podiatric concerns. Ophthalmologic evaluation is up to date.\par \par  Home glucose monitoring has shown values to be running mainly in the 140s-150s with some values up to 180s in the AM. Not testing at other times of the day. He does deny any hypoglycemia or hypoglycemic signs or symptoms.\par \par POCT A1c returned today at 8.0% ; previously 7.2% on 11/1/22 \par POCT glucose returned today at 158 mg/dL \par \par Additional medical history includes that of a fib, htn, hyperlipidemia ,sleep apnea. Is taking Eliquis  Coreg, amlodipine , losartan  and D3 1,000 iu daily.\par Is too taking Carvedilol, AMlodipine, Simvastatin and Eliquis\par \par Is exercising\par \par Hx of severe sleep apnea-uses cpap  Margarita Esparza-Dr. Stock\par

## 2023-02-14 ENCOUNTER — RX CHANGE (OUTPATIENT)
Age: 63
End: 2023-02-14

## 2023-02-14 RX ORDER — CARVEDILOL 25 MG/1
25 TABLET, FILM COATED ORAL TWICE DAILY
Qty: 60 | Refills: 5 | Status: DISCONTINUED | COMMUNITY
Start: 2022-07-21 | End: 2023-02-14

## 2023-02-14 RX ORDER — AMLODIPINE BESYLATE 2.5 MG/1
2.5 TABLET ORAL
Qty: 30 | Refills: 5 | Status: DISCONTINUED | COMMUNITY
Start: 2018-05-23 | End: 2023-02-14

## 2023-03-13 ENCOUNTER — APPOINTMENT (OUTPATIENT)
Dept: PULMONOLOGY | Facility: CLINIC | Age: 63
End: 2023-03-13
Payer: MEDICAID

## 2023-03-13 VITALS
RESPIRATION RATE: 15 BRPM | OXYGEN SATURATION: 96 % | DIASTOLIC BLOOD PRESSURE: 79 MMHG | BODY MASS INDEX: 47.74 KG/M2 | HEIGHT: 68 IN | TEMPERATURE: 97.2 F | WEIGHT: 315 LBS | HEART RATE: 99 BPM | SYSTOLIC BLOOD PRESSURE: 136 MMHG

## 2023-03-13 DIAGNOSIS — G47.33 OBSTRUCTIVE SLEEP APNEA (ADULT) (PEDIATRIC): ICD-10-CM

## 2023-03-13 PROCEDURE — 99212 OFFICE O/P EST SF 10 MIN: CPT

## 2023-03-13 RX ORDER — SITAGLIPTIN 100 MG/1
100 TABLET, FILM COATED ORAL DAILY
Qty: 90 | Refills: 1 | Status: DISCONTINUED | COMMUNITY
Start: 2022-05-05 | End: 2023-03-13

## 2023-03-13 NOTE — HISTORY OF PRESENT ILLNESS
[Obstructive Sleep Apnea] : obstructive sleep apnea [Nocturnal Awakenings: ___] : ~he/she~ typically has [unfilled] nocturnal awakenings [AHI: ___ per hour] : Apnea-hypopnea index:  [unfilled] per hour [T90%: ___] : T90%: [unfilled]% [Baljinder desatuation%: ___] : Baljinder desaturation:  [unfilled]% [Date: ___] : the most recent therapeutic polysomnogram was completed [unfilled] [CPAP: ___ cmH2O] : CPAP: [unfilled] cmH2O [% Days used: ____] : Days used: [unfilled] % [% Days used > 4 hrs: ____] : Days used > 4 hrs: [unfilled] % [Mean nightly usage: ___ hrs] : Mean nightly usage: [unfilled] hrs [Therapy based AHI: ___ /hr] : Therapy based AHI: [unfilled] / hr [To Bed: ___] : ~he/she~ goes to bed at [unfilled] [Arises: ___] : arises at [unfilled] [Sleep Onset Latency: ___ minutes] : sleep onset latency of [unfilled] minutes reported [TST: ___] : Total sleep time is [unfilled] [Daytime Sleep: ___] : daytime sleep: [unfilled] [FreeTextEntry1] : 62 year-old male on annual visit for obstructive sleep apnea treated with PAP therapy since 2014;  with history of: Atrial Fibrillation, HTN, HLD, T2DM, morbid obesity, glaucoma, and spinal stenosis.\par \par •	Severe ONEL (AHI 29.6) on APAP 10-20 cmH2O using a full-face mask.\par \par Prior snoring, fatigue, and sleepiness have resolved with PAP use nightly for approximately 8-hours.  Patient reports PAP has made a huge difference, noted when there was a power shortage while they were away on vacation.  He watches TV in bed at bedtime.  Updated medication list. [Snoring] : no snoring [Witnessed Apneas] : no witnessed sleep apnea [Frequent Nocturnal Awakening] : no nocturnal awakening [Unintentional Sleep while Active] : no unintentional sleep while active [Unintentional Sleep While Inactive] : no unintentional sleep while inactive [Awakes Unrefreshed] : does not awake unrefreshed [Awakes with Headache] : no headache upon awakening [Awakening With Dry Mouth] : no dry mouth upon awakening [Recent  Weight Gain] : no recent weight gain [Daytime Somnolence] : no daytime somnolence [DIS] : no DIS [DMS] : no DMS [Unusual Sleep Behavior] : no unusual sleep behavior [Lower Extremity Discomfort] : no lower extremity discomfort in evening or at bedtime [Nocturnal Oxygen] : The patient does not use nocturnal oxygen [ESS] : 5

## 2023-03-13 NOTE — REVIEW OF SYSTEMS
Suture/Staple Removal [Palpitations] : palpitations [Obesity] : obesity [Diabetes] : diabetes  [Arthralgias] : arthralgias [Negative] : Psychiatric [Fatigue] : no fatigue [Shortness Of Breath] : no shortness of breath [de-identified] : lacunar infarct Dx ~4-yrs ago [FreeTextEntry6] : without sleep disturbance

## 2023-03-13 NOTE — ASSESSMENT
[FreeTextEntry1] : 62 year old LAYLA MCGINNIS   benefits from PAP therapy for obstructive sleep apnea since 2014.\par \par •	Severe ONEL (AHI 29.6) on APAP 10-20 cmH2O using a full-face mask.\par \par Prior snoring, fatigue and sleepiness have resolved with PAP.  [place] Sleepiness Scale score is 5. \par \par AirSense 10 AutoSet downloaded data was reviewed with patient:  \par Excellent CPAP compliance (100% days, 100% >4-hrs, average 7-hrs 49-mins.),  \par excellent therapeutic response (AHI 0.1)  reduced from 29.6,\par acceptable mask leak,\par average pressure 13.1 cmH2O..        \par \par [facility] PAP supply renewal to continue APAP therapy at the current settings.\par Annual follow-up, sooner if needed.\par \par \par \par \par \par \par

## 2023-03-13 NOTE — PHYSICAL EXAM
[Normal Appearance] : normal appearance [Well Groomed] : well groomed [General Appearance - In No Acute Distress] : no acute distress [Normal Conjunctiva] : the conjunctiva exhibited no abnormalities [Neck Appearance] : the appearance of the neck was normal [Murmurs] : no murmurs [] : no respiratory distress [Auscultation Breath Sounds / Voice Sounds] : lungs were clear to auscultation bilaterally [Involuntary Movements] : no involuntary movements were seen [Non-Pitting] : non-pitting [No Focal Deficits] : no focal deficits [Affect] : the affect was normal [Mood] : the mood was normal

## 2023-03-17 ENCOUNTER — TRANSCRIPTION ENCOUNTER (OUTPATIENT)
Age: 63
End: 2023-03-17

## 2023-03-19 ENCOUNTER — RX RENEWAL (OUTPATIENT)
Age: 63
End: 2023-03-19

## 2023-03-30 ENCOUNTER — TRANSCRIPTION ENCOUNTER (OUTPATIENT)
Age: 63
End: 2023-03-30

## 2023-03-30 ENCOUNTER — RX RENEWAL (OUTPATIENT)
Age: 63
End: 2023-03-30

## 2023-04-21 ENCOUNTER — TRANSCRIPTION ENCOUNTER (OUTPATIENT)
Age: 63
End: 2023-04-21

## 2023-04-21 RX ORDER — FLASH GLUCOSE SENSOR
KIT MISCELLANEOUS
Qty: 6 | Refills: 3 | Status: ACTIVE | COMMUNITY
Start: 2022-09-21 | End: 1900-01-01

## 2023-04-25 ENCOUNTER — RX CHANGE (OUTPATIENT)
Age: 63
End: 2023-04-25

## 2023-04-25 RX ORDER — LOSARTAN POTASSIUM 100 MG/1
100 TABLET, FILM COATED ORAL
Qty: 30 | Refills: 2 | Status: DISCONTINUED | COMMUNITY
Start: 2022-05-09 | End: 2023-04-25

## 2023-05-10 ENCOUNTER — RX CHANGE (OUTPATIENT)
Age: 63
End: 2023-05-10

## 2023-05-10 RX ORDER — CARVEDILOL 12.5 MG/1
12.5 TABLET, FILM COATED ORAL
Qty: 180 | Refills: 2 | Status: DISCONTINUED | COMMUNITY
Start: 2021-03-29 | End: 2023-05-10

## 2023-05-11 ENCOUNTER — RX CHANGE (OUTPATIENT)
Age: 63
End: 2023-05-11

## 2023-05-11 RX ORDER — POTASSIUM CHLORIDE 750 MG/1
10 CAPSULE, EXTENDED RELEASE ORAL TWICE DAILY
Qty: 180 | Refills: 3 | Status: DISCONTINUED | COMMUNITY
Start: 2018-12-20 | End: 2023-05-11

## 2023-05-11 RX ORDER — BUMETANIDE 2 MG/1
2 TABLET ORAL
Qty: 30 | Refills: 5 | Status: DISCONTINUED | COMMUNITY
Start: 2023-01-05 | End: 2023-05-11

## 2023-05-11 RX ORDER — CARVEDILOL 25 MG/1
25 TABLET, FILM COATED ORAL
Qty: 180 | Refills: 2 | Status: DISCONTINUED | COMMUNITY
Start: 2023-02-14 | End: 2023-05-11

## 2023-05-11 RX ORDER — POTASSIUM CHLORIDE 750 MG/1
10 CAPSULE, EXTENDED RELEASE ORAL
Qty: 180 | Refills: 4 | Status: ACTIVE | COMMUNITY
Start: 2023-05-11 | End: 1900-01-01

## 2023-05-11 RX ORDER — AMLODIPINE BESYLATE 2.5 MG/1
2.5 TABLET ORAL
Qty: 90 | Refills: 2 | Status: DISCONTINUED | COMMUNITY
Start: 2023-02-14 | End: 2023-05-11

## 2023-05-18 ENCOUNTER — NON-APPOINTMENT (OUTPATIENT)
Age: 63
End: 2023-05-18

## 2023-05-18 ENCOUNTER — TRANSCRIPTION ENCOUNTER (OUTPATIENT)
Age: 63
End: 2023-05-18

## 2023-05-18 RX ORDER — EMPAGLIFLOZIN, METFORMIN HYDROCHLORIDE 12.5; 1 MG/1; MG/1
12.5-1 TABLET, EXTENDED RELEASE ORAL
Qty: 180 | Refills: 1 | Status: DISCONTINUED | COMMUNITY
Start: 2021-01-22 | End: 2023-05-18

## 2023-06-22 ENCOUNTER — NON-APPOINTMENT (OUTPATIENT)
Age: 63
End: 2023-06-22

## 2023-06-22 RX ORDER — METFORMIN ER 500 MG 500 MG/1
500 TABLET ORAL
Qty: 90 | Refills: 1 | Status: DISCONTINUED | COMMUNITY
Start: 2023-01-03 | End: 2023-06-22

## 2023-07-07 ENCOUNTER — APPOINTMENT (OUTPATIENT)
Dept: ENDOCRINOLOGY | Facility: CLINIC | Age: 63
End: 2023-07-07
Payer: MEDICAID

## 2023-07-07 VITALS
OXYGEN SATURATION: 93 % | HEIGHT: 68 IN | BODY MASS INDEX: 47.74 KG/M2 | HEART RATE: 107 BPM | TEMPERATURE: 97 F | DIASTOLIC BLOOD PRESSURE: 80 MMHG | WEIGHT: 315 LBS | SYSTOLIC BLOOD PRESSURE: 140 MMHG

## 2023-07-07 VITALS — SYSTOLIC BLOOD PRESSURE: 128 MMHG | DIASTOLIC BLOOD PRESSURE: 84 MMHG

## 2023-07-07 LAB
GLUCOSE BLDC GLUCOMTR-MCNC: 112
HBA1C MFR BLD HPLC: 6.6

## 2023-07-07 PROCEDURE — 83036 HEMOGLOBIN GLYCOSYLATED A1C: CPT | Mod: QW

## 2023-07-07 PROCEDURE — 82962 GLUCOSE BLOOD TEST: CPT

## 2023-07-07 PROCEDURE — 99214 OFFICE O/P EST MOD 30 MIN: CPT

## 2023-07-07 RX ORDER — BLOOD SUGAR DIAGNOSTIC
STRIP MISCELLANEOUS
Qty: 300 | Refills: 3 | Status: ACTIVE | COMMUNITY
Start: 2021-04-29 | End: 1900-01-01

## 2023-07-08 NOTE — ADDENDUM
[FreeTextEntry1] : POCT HbA1c and glucose carried out in office today given diabetes diagnosis.\par Blood will be drawn in office today.\par

## 2023-07-08 NOTE — HISTORY OF PRESENT ILLNESS
[FreeTextEntry1] : Mr. LAYLA MCGINNIS is a 63 year old male who returns with regard to a hx of type 2 dm. The diabetes has been present for over 15 years. He denies any history of retinopathy or nephropathy. + glaucoma With regard to neuropathy, he denies any neurologic s/s. \par \par For the diabetes,he is currently taking Synjardy 12.5/1000 mg twice daily. Too, was to be on Januvia 100 mg daily, however he has not been taking. Today is his first day switching from Synjardy XR to synjardy. \par \par He denies any c/p, sob or ophthalmologic or neurologic complaints.He too denies any skin lesions, skin breakdown or non-healing areas of skin. He too denies any podiatric concerns. Ophthalmologic evaluation is up to date.\par \par  Home glucose monitoring has shown values to be running overall in the \par  He does deny any hypoglycemia or hypoglycemic signs or symptoms.\par \par POCT A1c returned today at 6.6% ; previously 7.5% on 2/3/23\par POCT glucose returned today at 112 mg/dL \par \par Additional medical history includes that of a fib, htn, hyperlipidemia ,sleep apnea. Is taking Eliquis Coreg, amlodipine , losartan and D3 1,000 iu daily.\par Is too taking Carvedilol, AMlodipine, Simvastatin and Eliquis\par \par Is exercising\par \par Hx of severe sleep apnea-uses cpap Margarita Esparza-Dr. Stock\par

## 2023-07-10 ENCOUNTER — TRANSCRIPTION ENCOUNTER (OUTPATIENT)
Age: 63
End: 2023-07-10

## 2023-07-10 LAB
25(OH)D3 SERPL-MCNC: 39.8 NG/ML
ALBUMIN SERPL ELPH-MCNC: 4.1 G/DL
ALP BLD-CCNC: 78 U/L
ALT SERPL-CCNC: 13 U/L
ANION GAP SERPL CALC-SCNC: 15 MMOL/L
AST SERPL-CCNC: 16 U/L
BILIRUB SERPL-MCNC: 0.4 MG/DL
BUN SERPL-MCNC: 18 MG/DL
CALCIUM SERPL-MCNC: 9.4 MG/DL
CHLORIDE SERPL-SCNC: 103 MMOL/L
CHOLEST SERPL-MCNC: 132 MG/DL
CO2 SERPL-SCNC: 22 MMOL/L
CREAT SERPL-MCNC: 0.87 MG/DL
CREAT SPEC-SCNC: 87 MG/DL
EGFR: 97 ML/MIN/1.73M2
FRUCTOSAMINE SERPL-MCNC: 263 UMOL/L
GLUCOSE SERPL-MCNC: 101 MG/DL
GLYCOMARK.: 2.1 UG/ML
HCT VFR BLD CALC: 39.6 %
HDLC SERPL-MCNC: 34 MG/DL
HGB BLD-MCNC: 12.5 G/DL
LDLC SERPL DIRECT ASSAY-MCNC: 78 MG/DL
MAGNESIUM SERPL-MCNC: 2.2 MG/DL
MCHC RBC-ENTMCNC: 28.2 PG
MCHC RBC-ENTMCNC: 31.6 GM/DL
MCV RBC AUTO: 89.2 FL
MICROALBUMIN 24H UR DL<=1MG/L-MCNC: 19.2 MG/DL
MICROALBUMIN/CREAT 24H UR-RTO: 221 MG/G
PLATELET # BLD AUTO: 208 K/UL
POTASSIUM SERPL-SCNC: 4.5 MMOL/L
PROT SERPL-MCNC: 6.8 G/DL
RBC # BLD: 4.44 M/UL
RBC # FLD: 15 %
SODIUM SERPL-SCNC: 140 MMOL/L
T3FREE SERPL-MCNC: 2.7 PG/ML
T4 FREE SERPL-MCNC: 1.1 NG/DL
TRIGL SERPL-MCNC: 111 MG/DL
TSH SERPL-ACNC: 2.44 UIU/ML
VIT B12 SERPL-MCNC: 287 PG/ML
WBC # FLD AUTO: 7.39 K/UL

## 2023-07-11 ENCOUNTER — NON-APPOINTMENT (OUTPATIENT)
Age: 63
End: 2023-07-11

## 2023-07-11 ENCOUNTER — APPOINTMENT (OUTPATIENT)
Dept: INTERNAL MEDICINE | Facility: CLINIC | Age: 63
End: 2023-07-11
Payer: MEDICAID

## 2023-07-11 VITALS
WEIGHT: 315 LBS | BODY MASS INDEX: 47.74 KG/M2 | OXYGEN SATURATION: 95 % | HEIGHT: 68 IN | TEMPERATURE: 98.2 F | HEART RATE: 150 BPM

## 2023-07-11 VITALS — SYSTOLIC BLOOD PRESSURE: 136 MMHG | DIASTOLIC BLOOD PRESSURE: 72 MMHG

## 2023-07-11 DIAGNOSIS — Z12.5 ENCOUNTER FOR SCREENING FOR MALIGNANT NEOPLASM OF PROSTATE: ICD-10-CM

## 2023-07-11 DIAGNOSIS — Z00.00 ENCOUNTER FOR GENERAL ADULT MEDICAL EXAMINATION W/OUT ABNORMAL FINDINGS: ICD-10-CM

## 2023-07-11 DIAGNOSIS — Z12.11 ENCOUNTER FOR SCREENING FOR MALIGNANT NEOPLASM OF COLON: ICD-10-CM

## 2023-07-11 DIAGNOSIS — E53.8 DEFICIENCY OF OTHER SPECIFIED B GROUP VITAMINS: ICD-10-CM

## 2023-07-11 LAB — PSA SERPL-MCNC: 0.91 NG/ML

## 2023-07-11 PROCEDURE — 93000 ELECTROCARDIOGRAM COMPLETE: CPT

## 2023-07-11 PROCEDURE — 99396 PREV VISIT EST AGE 40-64: CPT | Mod: 25

## 2023-08-05 ENCOUNTER — NON-APPOINTMENT (OUTPATIENT)
Age: 63
End: 2023-08-05

## 2023-08-06 ENCOUNTER — TRANSCRIPTION ENCOUNTER (OUTPATIENT)
Age: 63
End: 2023-08-06

## 2023-08-07 ENCOUNTER — APPOINTMENT (OUTPATIENT)
Dept: INTERNAL MEDICINE | Facility: CLINIC | Age: 63
End: 2023-08-07
Payer: MEDICAID

## 2023-08-07 DIAGNOSIS — U07.1 COVID-19: ICD-10-CM

## 2023-08-07 PROCEDURE — 99442: CPT

## 2023-08-28 ENCOUNTER — TRANSCRIPTION ENCOUNTER (OUTPATIENT)
Age: 63
End: 2023-08-28

## 2023-09-12 NOTE — REASON FOR VISIT
Patient consented to quadrivalent flu vaccination today and tolerated well     [Initial Eval - Existing Diagnosis] : an initial evaluation of an existing diagnosis [Nocturna Hypoventilation] : nocturna  hypoventilation [Sleep Apnea] : sleep apnea [Family Member] : family member

## 2023-11-15 ENCOUNTER — APPOINTMENT (OUTPATIENT)
Dept: ENDOCRINOLOGY | Facility: CLINIC | Age: 63
End: 2023-11-15
Payer: MEDICAID

## 2023-11-15 VITALS
HEART RATE: 92 BPM | DIASTOLIC BLOOD PRESSURE: 80 MMHG | HEIGHT: 68 IN | BODY MASS INDEX: 47.74 KG/M2 | SYSTOLIC BLOOD PRESSURE: 130 MMHG | OXYGEN SATURATION: 97 % | WEIGHT: 315 LBS | TEMPERATURE: 98 F

## 2023-11-15 LAB
GLUCOSE BLDC GLUCOMTR-MCNC: 138
HBA1C MFR BLD HPLC: 7.3

## 2023-11-15 PROCEDURE — 83036 HEMOGLOBIN GLYCOSYLATED A1C: CPT | Mod: QW

## 2023-11-15 PROCEDURE — 36415 COLL VENOUS BLD VENIPUNCTURE: CPT

## 2023-11-15 PROCEDURE — 99214 OFFICE O/P EST MOD 30 MIN: CPT | Mod: 25

## 2023-11-15 PROCEDURE — 82962 GLUCOSE BLOOD TEST: CPT

## 2023-11-16 LAB
25(OH)D3 SERPL-MCNC: 44.5 NG/ML
ALBUMIN SERPL ELPH-MCNC: 4.2 G/DL
ALP BLD-CCNC: 78 U/L
ALT SERPL-CCNC: 13 U/L
ANION GAP SERPL CALC-SCNC: 15 MMOL/L
AST SERPL-CCNC: 16 U/L
BASOPHILS # BLD AUTO: 0.05 K/UL
BASOPHILS NFR BLD AUTO: 0.7 %
BILIRUB SERPL-MCNC: 0.4 MG/DL
BUN SERPL-MCNC: 19 MG/DL
CALCIUM SERPL-MCNC: 9.4 MG/DL
CHLORIDE SERPL-SCNC: 100 MMOL/L
CHOLEST SERPL-MCNC: 130 MG/DL
CO2 SERPL-SCNC: 24 MMOL/L
CREAT SERPL-MCNC: 0.89 MG/DL
CREAT SPEC-SCNC: 85 MG/DL
EGFR: 96 ML/MIN/1.73M2
EOSINOPHIL # BLD AUTO: 0.17 K/UL
EOSINOPHIL NFR BLD AUTO: 2.3 %
ESTIMATED AVERAGE GLUCOSE: 154 MG/DL
GLUCOSE SERPL-MCNC: 110 MG/DL
GLYCOMARK.: 2.2 UG/ML
HBA1C MFR BLD HPLC: 7 %
HCT VFR BLD CALC: 41.3 %
HDLC SERPL-MCNC: 36 MG/DL
HGB BLD-MCNC: 13 G/DL
IMM GRANULOCYTES NFR BLD AUTO: 0.3 %
LDLC SERPL DIRECT ASSAY-MCNC: 76 MG/DL
LYMPHOCYTES # BLD AUTO: 1.43 K/UL
LYMPHOCYTES NFR BLD AUTO: 19.2 %
MAGNESIUM SERPL-MCNC: 2.4 MG/DL
MAN DIFF?: NORMAL
MCHC RBC-ENTMCNC: 29.3 PG
MCHC RBC-ENTMCNC: 31.5 GM/DL
MCV RBC AUTO: 93.2 FL
MICROALBUMIN 24H UR DL<=1MG/L-MCNC: 15.1 MG/DL
MICROALBUMIN/CREAT 24H UR-RTO: 179 MG/G
MONOCYTES # BLD AUTO: 0.71 K/UL
MONOCYTES NFR BLD AUTO: 9.5 %
NEUTROPHILS # BLD AUTO: 5.07 K/UL
NEUTROPHILS NFR BLD AUTO: 68 %
PLATELET # BLD AUTO: 220 K/UL
POTASSIUM SERPL-SCNC: 4.9 MMOL/L
PROT SERPL-MCNC: 6.7 G/DL
RBC # BLD: 4.43 M/UL
RBC # FLD: 15.4 %
SODIUM SERPL-SCNC: 139 MMOL/L
T3FREE SERPL-MCNC: 2.67 PG/ML
T4 FREE SERPL-MCNC: 1.1 NG/DL
TRIGL SERPL-MCNC: 154 MG/DL
TSH SERPL-ACNC: 3.6 UIU/ML
WBC # FLD AUTO: 7.45 K/UL

## 2023-11-17 ENCOUNTER — RESULT REVIEW (OUTPATIENT)
Age: 63
End: 2023-11-17

## 2023-11-17 LAB — FRUCTOSAMINE SERPL-MCNC: 270 UMOL/L

## 2024-01-10 ENCOUNTER — TRANSCRIPTION ENCOUNTER (OUTPATIENT)
Age: 64
End: 2024-01-10

## 2024-01-11 ENCOUNTER — APPOINTMENT (OUTPATIENT)
Dept: INTERNAL MEDICINE | Facility: CLINIC | Age: 64
End: 2024-01-11
Payer: MEDICAID

## 2024-01-11 VITALS
DIASTOLIC BLOOD PRESSURE: 74 MMHG | WEIGHT: 315 LBS | RESPIRATION RATE: 16 BRPM | HEIGHT: 68 IN | SYSTOLIC BLOOD PRESSURE: 127 MMHG | HEART RATE: 95 BPM | BODY MASS INDEX: 47.74 KG/M2 | OXYGEN SATURATION: 97 % | TEMPERATURE: 98.6 F

## 2024-01-11 DIAGNOSIS — I48.91 UNSPECIFIED ATRIAL FIBRILLATION: ICD-10-CM

## 2024-01-11 DIAGNOSIS — G47.33 OBSTRUCTIVE SLEEP APNEA (ADULT) (PEDIATRIC): ICD-10-CM

## 2024-01-11 DIAGNOSIS — Z23 ENCOUNTER FOR IMMUNIZATION: ICD-10-CM

## 2024-01-11 PROCEDURE — G2211 COMPLEX E/M VISIT ADD ON: CPT

## 2024-01-11 PROCEDURE — 99214 OFFICE O/P EST MOD 30 MIN: CPT | Mod: 25

## 2024-01-11 PROCEDURE — G0008: CPT

## 2024-01-11 PROCEDURE — 90686 IIV4 VACC NO PRSV 0.5 ML IM: CPT

## 2024-01-11 RX ORDER — BUMETANIDE 2 MG/1
2 TABLET ORAL
Qty: 90 | Refills: 2 | Status: ACTIVE | COMMUNITY
Start: 2023-05-11 | End: 1900-01-01

## 2024-01-11 NOTE — REVIEW OF SYSTEMS
[Fever] : no fever [Chills] : no chills [Fatigue] : no fatigue [Pain] : no pain [Vision Problems] : no vision problems [Nasal Discharge] : no nasal discharge [Chest Pain] : no chest pain [Palpitations] : no palpitations [Lower Ext Edema] : lower extremity edema [Shortness Of Breath] : no shortness of breath [Wheezing] : no wheezing [Cough] : no cough [Dyspnea on Exertion] : not dyspnea on exertion [Abdominal Pain] : no abdominal pain [Nausea] : no nausea [Diarrhea] : no diarrhea [Vomiting] : no vomiting [Melena] : no melena [Dysuria] : no dysuria [Frequency] : no frequency [Muscle Weakness] : no muscle weakness [Back Pain] : no back pain [Headache] : no headache [Dizziness] : no dizziness [Fainting] : no fainting [FreeTextEntry5] : chronic LE edema

## 2024-01-11 NOTE — PLAN
[FreeTextEntry1] : Bilateral LE edema - patient reports chronic swelling of LE - continue bumetanide 2 mg QD and potassium tabs  - elevated legs frequently. Low salt diet. - no pain or calf tenderness.  HTN - Blood pressure at goal - continue amlodipine 2.5 mg QD , carvedilol 37.5 mg QD , losartan 100 mg QD - advised to log BP at home. - advised on dietary changes, increasing exercise, avoiding excess salt  DM type 2 - Nov 2023 labs showing a1c 7.0. elevated microalbumin/ creatinine - continue meds per endocrine -- synjardy 12.5-1000 mg BID  - Advised on lifestyle modifications such as increasing exercise and dietary changes. - pods and ophtho follow up - labs to be performed at endocrine follow up  Afib on eliquis - continue eliquis and coreg - denies excessive bleeding or bruising  HLD - lipid panel performed July 2023 - continue simvastatin 10 mg QD  - Advised on lifestyle modifications such as increasing exercise, cardio at least 150 mins per week and dietary changes.  Influenza vaccine - administered today, tolerated well

## 2024-01-11 NOTE — PHYSICAL EXAM
[No Acute Distress] : no acute distress [No Respiratory Distress] : no respiratory distress  [No Accessory Muscle Use] : no accessory muscle use [Clear to Auscultation] : lungs were clear to auscultation bilaterally [Normal Rate] : normal rate  [Regular Rhythm] : with a regular rhythm [Normal S1, S2] : normal S1 and S2 [Soft] : abdomen soft [Non Tender] : non-tender [Non-distended] : non-distended [Normal Bowel Sounds] : normal bowel sounds [Speech Grossly Normal] : speech grossly normal [Alert and Oriented x3] : oriented to person, place, and time [de-identified] : nonpitting LE edema with skin changes

## 2024-01-11 NOTE — HISTORY OF PRESENT ILLNESS
[FreeTextEntry1] : follow up [de-identified] : 64 yo M pmh Dm type 2, HTN, nonvavular a fib on eliquis, ONEL on CPAP, HLD presents for follow up Patient feels well today and denies all complaints  Reports chronic lower extremity edema with skin changes. Reports worse in the summer. Minimal in the morning and worsens during the day with standing. Denies pain or calf tenderness.

## 2024-01-22 ENCOUNTER — TRANSCRIPTION ENCOUNTER (OUTPATIENT)
Age: 64
End: 2024-01-22

## 2024-02-13 ENCOUNTER — RX RENEWAL (OUTPATIENT)
Age: 64
End: 2024-02-13

## 2024-02-13 RX ORDER — CARVEDILOL 25 MG/1
25 TABLET, FILM COATED ORAL
Qty: 180 | Refills: 3 | Status: ACTIVE | COMMUNITY
Start: 2023-05-11 | End: 1900-01-01

## 2024-02-13 RX ORDER — CARVEDILOL 12.5 MG/1
12.5 TABLET, FILM COATED ORAL
Qty: 180 | Refills: 3 | Status: ACTIVE | COMMUNITY
Start: 2023-05-10 | End: 1900-01-01

## 2024-02-13 RX ORDER — AMLODIPINE BESYLATE 2.5 MG/1
2.5 TABLET ORAL
Qty: 90 | Refills: 3 | Status: ACTIVE | COMMUNITY
Start: 2023-05-11 | End: 1900-01-01

## 2024-02-28 ENCOUNTER — TRANSCRIPTION ENCOUNTER (OUTPATIENT)
Age: 64
End: 2024-02-28

## 2024-02-28 RX ORDER — LOSARTAN POTASSIUM 100 MG/1
100 TABLET, FILM COATED ORAL
Qty: 90 | Refills: 0 | Status: ACTIVE | COMMUNITY
Start: 2023-04-25 | End: 1900-01-01

## 2024-03-18 ENCOUNTER — APPOINTMENT (OUTPATIENT)
Dept: ENDOCRINOLOGY | Facility: CLINIC | Age: 64
End: 2024-03-18
Payer: MEDICAID

## 2024-03-18 VITALS
HEIGHT: 68 IN | HEART RATE: 145 BPM | WEIGHT: 315 LBS | OXYGEN SATURATION: 89 % | BODY MASS INDEX: 47.74 KG/M2 | TEMPERATURE: 98.1 F | SYSTOLIC BLOOD PRESSURE: 140 MMHG | DIASTOLIC BLOOD PRESSURE: 80 MMHG

## 2024-03-18 VITALS — SYSTOLIC BLOOD PRESSURE: 136 MMHG | DIASTOLIC BLOOD PRESSURE: 78 MMHG

## 2024-03-18 DIAGNOSIS — E78.5 HYPERLIPIDEMIA, UNSPECIFIED: ICD-10-CM

## 2024-03-18 DIAGNOSIS — E11.9 TYPE 2 DIABETES MELLITUS W/OUT COMPLICATIONS: ICD-10-CM

## 2024-03-18 DIAGNOSIS — I10 ESSENTIAL (PRIMARY) HYPERTENSION: ICD-10-CM

## 2024-03-18 DIAGNOSIS — E66.9 OBESITY, UNSPECIFIED: ICD-10-CM

## 2024-03-18 LAB
GLUCOSE BLDC GLUCOMTR-MCNC: 118
HBA1C MFR BLD HPLC: 7.9

## 2024-03-18 PROCEDURE — 99214 OFFICE O/P EST MOD 30 MIN: CPT

## 2024-03-18 PROCEDURE — 36415 COLL VENOUS BLD VENIPUNCTURE: CPT | Mod: 59

## 2024-03-18 PROCEDURE — 82962 GLUCOSE BLOOD TEST: CPT

## 2024-03-18 PROCEDURE — 83036 HEMOGLOBIN GLYCOSYLATED A1C: CPT | Mod: QW

## 2024-03-18 NOTE — PHYSICAL EXAM
[Alert] : alert [No Acute Distress] : no acute distress [Well Nourished] : well nourished [Normal Sclera/Conjunctiva] : normal sclera/conjunctiva [Well Developed] : well developed [EOMI] : extra ocular movement intact [No Proptosis] : no proptosis [Normal Oropharynx] : the oropharynx was normal [Thyroid Not Enlarged] : the thyroid was not enlarged [No Thyroid Nodules] : no palpable thyroid nodules [No Respiratory Distress] : no respiratory distress [No Accessory Muscle Use] : no accessory muscle use [Clear to Auscultation] : lungs were clear to auscultation bilaterally [Normal Rate] : heart rate was normal [Normal S1, S2] : normal S1 and S2 [Regular Rhythm] : with a regular rhythm [No Edema] : no peripheral edema [Normal Bowel Sounds] : normal bowel sounds [Pedal Pulses Normal] : the pedal pulses are present [Not Distended] : not distended [Not Tender] : non-tender [Soft] : abdomen soft [Normal Anterior Cervical Nodes] : no anterior cervical lymphadenopathy [Normal Posterior Cervical Nodes] : no posterior cervical lymphadenopathy [No Spinal Tenderness] : no spinal tenderness [No Stigmata of Cushings Syndrome] : no stigmata of Cushings Syndrome [Spine Straight] : spine straight [Normal Gait] : normal gait [No Rash] : no rash [Normal Strength/Tone] : muscle strength and tone were normal [Normal Reflexes] : deep tendon reflexes were 2+ and symmetric [No Tremors] : no tremors [Oriented x3] : oriented to person, place, and time [Acanthosis Nigricans] : no acanthosis nigricans

## 2024-03-18 NOTE — ADDENDUM
[FreeTextEntry1] : POCT glucose testing and Hemoglobin A1c was carried out today given diabetes diagnosis. Blood will be drawn in office today.  This note was written by Bob Steinberg on 03/18/2024 acting as medical scribe for Dr. Dima Peralta. I, Dr. Dima Peralta, have read and attest that all the information, medical decision making and discharge instructions within are true and accurate.

## 2024-03-18 NOTE — HISTORY OF PRESENT ILLNESS
[FreeTextEntry1] : Mr. LAYLA MCGINNIS is a 63-year-old male who returns with regard to a hx of type 2 dm. The diabetes has been present for over 15 years. He denies any history of retinopathy or nephropathy. + glaucoma with regard to neuropathy, he denies any neurologic s/s.  For the diabetes, he is currently taking Synjardy 12.5/100 mg bid.  He denies any c/p, sob or ophthalmologic or neurologic complaints. He too denies any skin lesions, skin breakdown or non-healing areas of skin. He too denies any podiatric concerns. Ophthalmologic evaluation is up to date- no diabetic changes noted.  Home glucose monitoring has shown values to be running 130s to 140s range at around 6 AM, and 160s at around 11 AM.  He does deny any hypoglycemia or hypoglycemic signs or symptoms.  POCT A1C returned today at 7.9%, previously 7% in November 2023.  POCT glucose today at 118 mg/dL.  Is exercising. Continues to struggle with weight loss despite following dietary discretion. His current weight is 418 pounds, previously 417 pounds at the last visit in November 2023.    Additional medical history includes that of a fib, htn, hyperlipidemia, sleep apnea. Is taking  Hx of severe sleep apnea-uses cpap Margarita Esparza-Dr. Stock  Additional Medications: Carvedilol, Amlodipine, Simvastatin, Eliquis, Coreg, losartan and D3 1,000 iu daily.

## 2024-03-19 ENCOUNTER — TRANSCRIPTION ENCOUNTER (OUTPATIENT)
Age: 64
End: 2024-03-19

## 2024-03-19 LAB
25(OH)D3 SERPL-MCNC: 39.9 NG/ML
ALBUMIN SERPL ELPH-MCNC: 4.1 G/DL
ALP BLD-CCNC: 76 U/L
ALT SERPL-CCNC: 12 U/L
ANION GAP SERPL CALC-SCNC: 15 MMOL/L
AST SERPL-CCNC: 16 U/L
BASOPHILS # BLD AUTO: 0.05 K/UL
BASOPHILS NFR BLD AUTO: 0.6 %
BILIRUB SERPL-MCNC: 0.5 MG/DL
BUN SERPL-MCNC: 22 MG/DL
CALCIUM SERPL-MCNC: 10 MG/DL
CHLORIDE SERPL-SCNC: 99 MMOL/L
CHOLEST SERPL-MCNC: 129 MG/DL
CO2 SERPL-SCNC: 25 MMOL/L
CREAT SERPL-MCNC: 1.13 MG/DL
CREAT SPEC-SCNC: 84 MG/DL
EGFR: 73 ML/MIN/1.73M2
EOSINOPHIL # BLD AUTO: 0.17 K/UL
EOSINOPHIL NFR BLD AUTO: 2.2 %
ESTIMATED AVERAGE GLUCOSE: 166 MG/DL
FRUCTOSAMINE SERPL-MCNC: 289 UMOL/L
GLUCOSE SERPL-MCNC: 104 MG/DL
GLYCOMARK.: 2.5 UG/ML
HBA1C MFR BLD HPLC: 7.4 %
HCT VFR BLD CALC: 41.5 %
HDLC SERPL-MCNC: 35 MG/DL
HGB BLD-MCNC: 13.2 G/DL
IMM GRANULOCYTES NFR BLD AUTO: 0.3 %
LDLC SERPL DIRECT ASSAY-MCNC: 73 MG/DL
LYMPHOCYTES # BLD AUTO: 1.62 K/UL
LYMPHOCYTES NFR BLD AUTO: 21 %
MAGNESIUM SERPL-MCNC: 2.3 MG/DL
MAN DIFF?: NORMAL
MCHC RBC-ENTMCNC: 28.2 PG
MCHC RBC-ENTMCNC: 31.8 GM/DL
MCV RBC AUTO: 88.7 FL
MICROALBUMIN 24H UR DL<=1MG/L-MCNC: 21.1 MG/DL
MICROALBUMIN/CREAT 24H UR-RTO: 250 MG/G
MONOCYTES # BLD AUTO: 0.66 K/UL
MONOCYTES NFR BLD AUTO: 8.6 %
NEUTROPHILS # BLD AUTO: 5.18 K/UL
NEUTROPHILS NFR BLD AUTO: 67.3 %
PLATELET # BLD AUTO: 226 K/UL
POTASSIUM SERPL-SCNC: 4.8 MMOL/L
PROT SERPL-MCNC: 7.6 G/DL
RBC # BLD: 4.68 M/UL
RBC # FLD: 15 %
SODIUM SERPL-SCNC: 139 MMOL/L
T3FREE SERPL-MCNC: 3.13 PG/ML
T4 FREE SERPL-MCNC: 1.2 NG/DL
TRIGL SERPL-MCNC: 136 MG/DL
TSH SERPL-ACNC: 2.88 UIU/ML
VIT B12 SERPL-MCNC: 207 PG/ML
WBC # FLD AUTO: 7.7 K/UL

## 2024-03-19 RX ORDER — EMPAGLIFLOZIN AND METFORMIN HYDROCHLORIDE 12.5; 1 MG/1; MG/1
12.5-1 TABLET ORAL
Qty: 180 | Refills: 2 | Status: ACTIVE | COMMUNITY
Start: 2023-05-18 | End: 1900-01-01

## 2024-03-19 RX ORDER — MAGNESIUM 200 MG
1000 TABLET ORAL
Qty: 45 | Refills: 1 | Status: ACTIVE | COMMUNITY
Start: 2023-07-11 | End: 1900-01-01

## 2024-03-19 RX ORDER — LINAGLIPTIN 5 MG/1
5 TABLET, FILM COATED ORAL DAILY
Qty: 90 | Refills: 1 | Status: ACTIVE | COMMUNITY
Start: 2024-03-19 | End: 1900-01-01

## 2024-06-10 ENCOUNTER — APPOINTMENT (OUTPATIENT)
Dept: PULMONOLOGY | Facility: CLINIC | Age: 64
End: 2024-06-10
Payer: MEDICAID

## 2024-06-10 VITALS
HEART RATE: 89 BPM | DIASTOLIC BLOOD PRESSURE: 85 MMHG | HEIGHT: 68 IN | BODY MASS INDEX: 47.74 KG/M2 | WEIGHT: 315 LBS | TEMPERATURE: 97.8 F | RESPIRATION RATE: 16 BRPM | SYSTOLIC BLOOD PRESSURE: 134 MMHG | OXYGEN SATURATION: 93 %

## 2024-06-10 VITALS — WEIGHT: 315 LBS | BODY MASS INDEX: 61.94 KG/M2

## 2024-06-10 DIAGNOSIS — G47.33 OBSTRUCTIVE SLEEP APNEA (ADULT) (PEDIATRIC): ICD-10-CM

## 2024-06-10 PROCEDURE — 99214 OFFICE O/P EST MOD 30 MIN: CPT

## 2024-06-12 ENCOUNTER — TRANSCRIPTION ENCOUNTER (OUTPATIENT)
Age: 64
End: 2024-06-12

## 2024-06-12 RX ORDER — SIMVASTATIN 10 MG/1
10 TABLET, FILM COATED ORAL
Qty: 90 | Refills: 1 | Status: ACTIVE | COMMUNITY
Start: 2023-05-10 | End: 1900-01-01

## 2024-06-18 PROBLEM — G47.33 OBSTRUCTIVE SLEEP APNEA, ADULT: Status: ACTIVE | Noted: 2024-06-18

## 2024-06-18 NOTE — ASSESSMENT
[FreeTextEntry1] : 65 y/o M with h/o Severe ONEL, initially diagnosed in 2014, with latest PSG from 2019 showing an RDI of 32.7/hr, treated with APAP 10-20 cmH20 and nocturnal supplemental oxygen of 2LPM, presents for a follow-up visit. Comorbid conditions include Afib, HTN, HLD, T2DM, morbid obesity, glaucoma, and spinal stenosis.   30-day Therapy and Compliance Data through 6/9/2024 was reviewed with the patient which showed therapy-based AHI of 0.1/hr on 10-20 cmH20, 95th percentile pressure of 12.7 cmH20. Compliance showed a 100% of nightly usage, averaging 8-hours and 39 minutes.   The patient continues to derive benefit from APAP, utilizing a FFM, with normalization of AHI and resolution to apnea-related symptoms. Therefore, he will continue with nightly APAP use with supplemental oxygen, as prescribed. Rx for new APAP order with supplemental oxygen of 2lpm was placed to [employer], [employer], as requested, as his current device is > 5 years.  Requested to provide patient with heated tubing as he is experiencing dryness, which he did not previously when he was receiving the heated tubing.  The ramifications of ONEL and the importance of continued treatment was discussed with the patient and sister. F/U in 2-months from receiving the new APAP device to evaluate continued efficacy of treatment or sooner if he has difficulty acclimating to the new device.

## 2024-06-18 NOTE — HISTORY OF PRESENT ILLNESS
[Nocturnal Oxygen] : The patient uses nocturnal oxygen [O2 Rate: ___] : O2 rate is [unfilled] lpm [FreeTextEntry1] : Treatment: APAP 10-20 cmH20 Device: AirSense 10 Autoset DME company: [employer] Set-up: 6/21/2019

## 2024-06-18 NOTE — PHYSICAL EXAM
[Normal Appearance] : normal appearance [General Appearance - In No Acute Distress] : no acute distress [Normal Conjunctiva] : the conjunctiva exhibited no abnormalities [IV] : IV [Neck Appearance] : the appearance of the neck was normal [Heart Sounds] : normal S1 and S2 [Murmurs] : no murmurs [] : no respiratory distress [Respiration, Rhythm And Depth] : normal respiratory rhythm and effort [Exaggerated Use Of Accessory Muscles For Inspiration] : no accessory muscle use [Auscultation Breath Sounds / Voice Sounds] : lungs were clear to auscultation bilaterally [Involuntary Movements] : no involuntary movements were seen [Cyanosis, Localized] : no localized cyanosis [No Focal Deficits] : no focal deficits [Oriented To Time, Place, And Person] : oriented to person, place, and time [Impaired Insight] : insight and judgment were intact [Affect] : the affect was normal [Mood] : the mood was normal [FreeTextEntry1] : obese

## 2024-06-18 NOTE — REVIEW OF SYSTEMS
[Obesity] : obesity [Diabetes] : diabetes  [Arthralgias] : arthralgias [Negative] : Psychiatric [A.M. Dry Mouth] : a.m. dry mouth [Awakes With Dry Mouth] : awakes with dry mouth [Fatigue] : no fatigue [Shortness Of Breath] : no shortness of breath [Chest Pain] : no chest pain [A.M. Headache] : no headache present upon awakening [Snoring] : no snoring [Witnessed Apneas] : demonstrated no ~M apnea [Frequent Nocturnal Awakenings] : no nocturnal awakenings from sleep [Daytime Somnolence: ESS=____] : no daytime somnolence [Unintentional Sleep while active] : no unintentional sleep while active [Unintentional Sleep while inactive] : no unintentional sleep while inactive [Awakes Unrefreshed] : restorative sleep [Awakes With Headache] : awakes without a headache [Recent Wt Loss (___ Lbs)] : no recent weight loss [Recent Wt Gain (___ Lbs)] : no recent weight gain [Difficulty Initiating Sleep] : no difficulty falling asleep [Difficulty Maintaining Sleep] : no difficulty maintaining sleep [Irresistible urge to move legs] : no irresistible urge to move legs because of lower extremity discomfort [LE discomfort relieved by movement] : lower extremity discomfort not relieved by movement [Unusual Sleep Behavior] : no unusual sleep behavior [Hypersomnolence] : not sleeping much more than usual [Cataplexy] :  no cataplexy [Hypnogogic Hallucinations] : no hypnogogic hallucinations [Hypnopompic Hallucinations] : no hypnopompic hallucinations [Sleep Paralysis] : no sleep paralysis [FreeTextEntry9] : h/o afib [de-identified] : lacunar infarct Dx ~4 yrs ago [FreeTextEntry1] : 10-11 PM [FreeTextEntry2] : 5:30 - 6 AM [FreeTextEntry3] : brief  [FreeTextEntry4] : once at 3 pm [FreeTextEntry5] : within 30-40 minutes  [FreeTextEntry6] : 8-hours  [FreeTextEntry7] : 1-hour afternoon refreshing nap 1-2 times weekly

## 2024-07-03 ENCOUNTER — TRANSCRIPTION ENCOUNTER (OUTPATIENT)
Age: 64
End: 2024-07-03

## 2024-07-04 ENCOUNTER — RX RENEWAL (OUTPATIENT)
Age: 64
End: 2024-07-04

## 2024-07-15 ENCOUNTER — RX RENEWAL (OUTPATIENT)
Age: 64
End: 2024-07-15

## 2024-07-19 ENCOUNTER — TRANSCRIPTION ENCOUNTER (OUTPATIENT)
Age: 64
End: 2024-07-19

## 2024-07-23 ENCOUNTER — RX RENEWAL (OUTPATIENT)
Age: 64
End: 2024-07-23

## 2024-07-23 ENCOUNTER — TRANSCRIPTION ENCOUNTER (OUTPATIENT)
Age: 64
End: 2024-07-23

## 2024-07-24 ENCOUNTER — TRANSCRIPTION ENCOUNTER (OUTPATIENT)
Age: 64
End: 2024-07-24

## 2024-07-29 ENCOUNTER — NON-APPOINTMENT (OUTPATIENT)
Age: 64
End: 2024-07-29

## 2024-07-29 ENCOUNTER — APPOINTMENT (OUTPATIENT)
Dept: INTERNAL MEDICINE | Facility: CLINIC | Age: 64
End: 2024-07-29
Payer: MEDICAID

## 2024-07-29 VITALS
HEIGHT: 68 IN | OXYGEN SATURATION: 97 % | WEIGHT: 315 LBS | SYSTOLIC BLOOD PRESSURE: 128 MMHG | DIASTOLIC BLOOD PRESSURE: 85 MMHG | BODY MASS INDEX: 47.74 KG/M2 | TEMPERATURE: 97.7 F | HEART RATE: 86 BPM

## 2024-07-29 DIAGNOSIS — G47.33 OBSTRUCTIVE SLEEP APNEA (ADULT) (PEDIATRIC): ICD-10-CM

## 2024-07-29 DIAGNOSIS — Z00.00 ENCOUNTER FOR GENERAL ADULT MEDICAL EXAMINATION W/OUT ABNORMAL FINDINGS: ICD-10-CM

## 2024-07-29 DIAGNOSIS — M79.89 OTHER SPECIFIED SOFT TISSUE DISORDERS: ICD-10-CM

## 2024-07-29 DIAGNOSIS — I48.91 UNSPECIFIED ATRIAL FIBRILLATION: ICD-10-CM

## 2024-07-29 PROCEDURE — 99214 OFFICE O/P EST MOD 30 MIN: CPT | Mod: 25

## 2024-07-29 PROCEDURE — 36415 COLL VENOUS BLD VENIPUNCTURE: CPT

## 2024-07-29 PROCEDURE — 99396 PREV VISIT EST AGE 40-64: CPT

## 2024-07-29 PROCEDURE — 93000 ELECTROCARDIOGRAM COMPLETE: CPT | Mod: 59

## 2024-07-29 NOTE — ASSESSMENT
[FreeTextEntry1] : Health Care Maintenance - well visit - routine labs ordered - depression screen negative - ekg Afib, grossly unchanged  - colonoscopy- declines, cologuard requisition given   - flu vaccine- reports utd - covid vaccines- none - tdap 4/2021 - shingles vaccine- unsure if he received  - pneumonia vaccine- s/p pneurmovax 23, advise prevnar 20 - advised to get annual eye exams with optometry/ophthalmology, skin exams with dermatology, and dental exams  HTN - Blood pressure at goal - continue amlodipine 2.5 mg QD , carvedilol 37.5 mg QD , losartan 100 mg QD  DM type 2 - continue meds per endocrine -- synjardy 12.5-1000 mg BID and tradjenta - check a1c - pods and ophtho annually   HLD - continue simvastatin 10 mg QD - check cmp and lipid panel  Afib  - continue eliquis and coreg  Obesity -BMI 60+  -states he can not be physically active due to Afib, not interested in surgical options -advised to discuss medical mgmt with deven Peralta   Vitamin B12 deficiency -check level   RTC in 3 months (Dr. Rai patient)

## 2024-07-29 NOTE — HISTORY OF PRESENT ILLNESS
[FreeTextEntry1] : cpe (Dr. Rai patient) [de-identified] : LAYLA MCGINNIS is a 64 year M who presents for CPE PMHx Dm type 2, HTN, nonvavular Afib on eliquis, ONEL on CPAP, HLD, glaucoma  Feels well overall  Endo Dr. Peralta Ophthalmology- does not recall name  Sleep specialist for ONEL- NP Giuseppe

## 2024-07-29 NOTE — HEALTH RISK ASSESSMENT
[Never] : Never [No] : In the past 12 months have you used drugs other than those required for medical reasons? No [0] : 2) Feeling down, depressed, or hopeless: Not at all (0) [PHQ-2 Negative - No further assessment needed] : PHQ-2 Negative - No further assessment needed [Patient declined colonoscopy] : Patient declined colonoscopy [Unemployed] : unemployed [Single] : single [# Of Children ___] : has [unfilled] children [Feels Safe at Home] : Feels safe at home [Fully functional (bathing, dressing, toileting, transferring, walking, feeding)] : Fully functional (bathing, dressing, toileting, transferring, walking, feeding) [Fully functional (using the telephone, shopping, preparing meals, housekeeping, doing laundry, using] : Fully functional and needs no help or supervision to perform IADLs (using the telephone, shopping, preparing meals, housekeeping, doing laundry, using transportation, managing medications and managing finances) [HIV test declined] : HIV test declined [Hepatitis C test declined] : Hepatitis C test declined [[id number]] : 0 [de-identified] : sister

## 2024-07-29 NOTE — PHYSICAL EXAM
[No Acute Distress] : no acute distress [Well-Appearing] : well-appearing [Normal Sclera/Conjunctiva] : normal sclera/conjunctiva [EOMI] : extraocular movements intact [No Lymphadenopathy] : no lymphadenopathy [Supple] : supple [No Respiratory Distress] : no respiratory distress  [No Accessory Muscle Use] : no accessory muscle use [Clear to Auscultation] : lungs were clear to auscultation bilaterally [Normal Rate] : normal rate  [Regular Rhythm] : with a regular rhythm [Normal S1, S2] : normal S1 and S2 [Soft] : abdomen soft [Non Tender] : non-tender [Non-distended] : non-distended [Normal Bowel Sounds] : normal bowel sounds [Normal Affect] : the affect was normal [Normal Insight/Judgement] : insight and judgment were intact [de-identified] : chronic non-pitting LE edema with venous stasis changes [de-identified] : +umbilical hernia noted, reproducible  [de-identified] : ambulates with cane

## 2024-07-30 LAB
25(OH)D3 SERPL-MCNC: 43.9 NG/ML
ALBUMIN SERPL ELPH-MCNC: 4.1 G/DL
ALP BLD-CCNC: 81 U/L
ALT SERPL-CCNC: 13 U/L
ANION GAP SERPL CALC-SCNC: 15 MMOL/L
AST SERPL-CCNC: 18 U/L
BILIRUB SERPL-MCNC: 0.4 MG/DL
BUN SERPL-MCNC: 15 MG/DL
CALCIUM SERPL-MCNC: 9.8 MG/DL
CHLORIDE SERPL-SCNC: 100 MMOL/L
CHOLEST SERPL-MCNC: 142 MG/DL
CO2 SERPL-SCNC: 25 MMOL/L
CREAT SERPL-MCNC: 1.02 MG/DL
EGFR: 82 ML/MIN/1.73M2
ESTIMATED AVERAGE GLUCOSE: 148 MG/DL
GLUCOSE SERPL-MCNC: 127 MG/DL
HBA1C MFR BLD HPLC: 6.8 %
HCT VFR BLD CALC: 38.2 %
HDLC SERPL-MCNC: 34 MG/DL
HGB BLD-MCNC: 12.6 G/DL
LDLC SERPL CALC-MCNC: 81 MG/DL
MCHC RBC-ENTMCNC: 28.2 PG
MCHC RBC-ENTMCNC: 33 GM/DL
MCV RBC AUTO: 85.5 FL
NONHDLC SERPL-MCNC: 107 MG/DL
PLATELET # BLD AUTO: 206 K/UL
POTASSIUM SERPL-SCNC: 4.7 MMOL/L
PROT SERPL-MCNC: 6.9 G/DL
PSA SERPL-MCNC: 1.15 NG/ML
RBC # BLD: 4.47 M/UL
RBC # FLD: 15.3 %
SODIUM SERPL-SCNC: 140 MMOL/L
TRIGL SERPL-MCNC: 153 MG/DL
TSH SERPL-ACNC: 2.88 UIU/ML
VIT B12 SERPL-MCNC: 308 PG/ML
WBC # FLD AUTO: 8 K/UL

## 2024-08-06 ENCOUNTER — APPOINTMENT (OUTPATIENT)
Dept: PULMONOLOGY | Facility: CLINIC | Age: 64
End: 2024-08-06

## 2024-08-08 ENCOUNTER — TRANSCRIPTION ENCOUNTER (OUTPATIENT)
Age: 64
End: 2024-08-08

## 2024-08-09 ENCOUNTER — APPOINTMENT (OUTPATIENT)
Dept: ENDOCRINOLOGY | Facility: CLINIC | Age: 64
End: 2024-08-09

## 2024-08-09 PROCEDURE — 82962 GLUCOSE BLOOD TEST: CPT

## 2024-08-09 PROCEDURE — G2211 COMPLEX E/M VISIT ADD ON: CPT | Mod: NC,1L

## 2024-08-09 PROCEDURE — 99214 OFFICE O/P EST MOD 30 MIN: CPT

## 2024-08-09 NOTE — ADDENDUM
[FreeTextEntry1] : POCT glucose testing and Hemoglobin A1c was carried out today given diabetes diagnosis.  This note was written by Darby Hurtado on 08/09/2024 acting as medical scribe for Dr. Dima Peralta. I, Dr. Dima Peralta, have read and attest that all the information, medical decision making and discharge instructions within are true and accurate.

## 2024-08-09 NOTE — HISTORY OF PRESENT ILLNESS
[FreeTextEntry1] : Mr. LAYLA MCGINNIS is a 64 year-old male who returns with regard to a hx of type 2 dm. The diabetes has been present for over 15 years. He denies any history of retinopathy or nephropathy. + glaucoma with regard to neuropathy, he denies any neurologic s/s.  For the diabetes, he is currently taking Synjardy 12.5/100 mg bid and tradjenta 5 mg   He denies any c/p, sob or ophthalmologic or neurologic complaints. He too denies any skin lesions, skin breakdown or non-healing areas of skin. He too denies any podiatric concerns. Ophthalmologic evaluation is up to date- no diabetic changes noted. was told he has a "nub" behind eye but told not diabetic related   Home glucose monitoring has shown values to be running  He does deny any hypoglycemia or hypoglycemic signs or symptoms.  a1c returned at 6.8% on 7/29/24   POCT glucose today at 123 mg/dL.  Is exercising. Continues to struggle with weight loss despite following dietary discretion. His current weight is 406.   labs dated 7/29/24  vitamin D 43.9 hgb 12.6  hct 38.2 PSa 1.15  b12 308  TSH 2.88  LDL 81  gfr 82   Additional medical history includes that of a fib, htn, hyperlipidemia, sleep apnea. Is taking Hx of severe sleep apnea-uses cpap Margarita Esparza-Dr. Stock  Additional Medications: Carvedilol, Amlodipine, Simvastatin, Eliquis, Coreg, losartan and D3 1,000 iu qd, b12 Mon, wed, fri

## 2024-08-29 ENCOUNTER — APPOINTMENT (OUTPATIENT)
Dept: PULMONOLOGY | Facility: CLINIC | Age: 64
End: 2024-08-29
Payer: MEDICAID

## 2024-08-29 VITALS
BODY MASS INDEX: 47.74 KG/M2 | WEIGHT: 315 LBS | HEIGHT: 68 IN | DIASTOLIC BLOOD PRESSURE: 83 MMHG | SYSTOLIC BLOOD PRESSURE: 146 MMHG | OXYGEN SATURATION: 94 % | TEMPERATURE: 97.7 F | RESPIRATION RATE: 16 BRPM | HEART RATE: 115 BPM

## 2024-08-29 DIAGNOSIS — G47.33 OBSTRUCTIVE SLEEP APNEA (ADULT) (PEDIATRIC): ICD-10-CM

## 2024-08-29 PROCEDURE — 99213 OFFICE O/P EST LOW 20 MIN: CPT

## 2024-08-29 NOTE — HISTORY OF PRESENT ILLNESS
[Nocturnal Oxygen] : The patient uses nocturnal oxygen [O2 Rate: ___] : O2 rate is [unfilled] lpm [FreeTextEntry1] : Treatment: APAP 10-20 cmH20 Device: AirSense 11 [employer]et DME company: [employer] Set-up: 7/18/2024

## 2024-08-29 NOTE — ASSESSMENT
[FreeTextEntry1] : 65 y/o M with h/o Severe ONEL, initially diagnosed in 2014, with latest PSG from 2019 showing an RDI of 32.7/hr, treated with APAP 10-20 cmH20 and nocturnal supplemental oxygen of 2LPM, presents for a follow-up visit after receiving a new APAP device to continue with therapy.   30-day Therapy and Compliance Data through 8/28/2024 was reviewed with the patient which showed therapy-based AHI of 0/hr on 10-20 cmH20, 95th percentile pressure of 12.6 cmH20. Compliance showed a 100% of nightly usage, averaging 10-hours and 25 minutes.   The patient continues to derive benefit from APAP, utilizing a FFM, with normalization of AHI and resolution to apnea-related symptoms. Therefore, he will continue with nightly APAP use with supplemental oxygen, as prescribed. Rx for renewal of pap supplies was placed to DME company, [employer].  The ramifications of ONEL and the importance of continued treatment was discussed with the patient and sister. F/U in 1 year or sooner if needed.

## 2024-08-29 NOTE — REVIEW OF SYSTEMS
[A.M. Dry Mouth] : a.m. dry mouth [Obesity] : obesity [Diabetes] : diabetes  [Arthralgias] : arthralgias [Awakes With Dry Mouth] : awakes with dry mouth [Negative] : Psychiatric [Fatigue] : no fatigue [Shortness Of Breath] : no shortness of breath [Chest Pain] : no chest pain [A.M. Headache] : no headache present upon awakening [Snoring] : no snoring [Witnessed Apneas] : demonstrated no ~M apnea [Frequent Nocturnal Awakenings] : no nocturnal awakenings from sleep [Daytime Somnolence: ESS=____] : no daytime somnolence [Unintentional Sleep while active] : no unintentional sleep while active [Unintentional Sleep while inactive] : no unintentional sleep while inactive [Awakes Unrefreshed] : restorative sleep [Awakes With Headache] : awakes without a headache [Recent Wt Loss (___ Lbs)] : no recent weight loss [Recent Wt Gain (___ Lbs)] : no recent weight gain [Difficulty Initiating Sleep] : no difficulty falling asleep [Difficulty Maintaining Sleep] : no difficulty maintaining sleep [Irresistible urge to move legs] : no irresistible urge to move legs because of lower extremity discomfort [LE discomfort relieved by movement] : lower extremity discomfort not relieved by movement [Unusual Sleep Behavior] : no unusual sleep behavior [Hypersomnolence] : not sleeping much more than usual [Cataplexy] :  no cataplexy [Hypnogogic Hallucinations] : no hypnogogic hallucinations [Hypnopompic Hallucinations] : no hypnopompic hallucinations [Sleep Paralysis] : no sleep paralysis [FreeTextEntry9] : h/o afib [de-identified] : lacunar infarct Dx ~4 yrs ago [FreeTextEntry1] : 8 PM [FreeTextEntry2] : 4- 5 AM [FreeTextEntry3] : brief  [FreeTextEntry4] : 0 [FreeTextEntry5] : n/a [FreeTextEntry6] : 9-10 hours [FreeTextEntry7] : 1-hour afternoon refreshing nap 1-2 times weekly

## 2024-08-30 ENCOUNTER — APPOINTMENT (OUTPATIENT)
Age: 64
End: 2024-08-30
Payer: COMMERCIAL

## 2024-08-30 PROCEDURE — D9310: CPT

## 2024-08-30 PROCEDURE — D7140: CPT

## 2024-10-16 ENCOUNTER — TRANSCRIPTION ENCOUNTER (OUTPATIENT)
Age: 64
End: 2024-10-16

## 2024-11-14 ENCOUNTER — TRANSCRIPTION ENCOUNTER (OUTPATIENT)
Age: 64
End: 2024-11-14

## 2024-11-14 ENCOUNTER — APPOINTMENT (OUTPATIENT)
Dept: INTERNAL MEDICINE | Facility: CLINIC | Age: 64
End: 2024-11-14
Payer: MEDICAID

## 2024-11-14 VITALS
OXYGEN SATURATION: 98 % | SYSTOLIC BLOOD PRESSURE: 111 MMHG | HEIGHT: 68 IN | TEMPERATURE: 97.7 F | HEART RATE: 94 BPM | DIASTOLIC BLOOD PRESSURE: 74 MMHG | WEIGHT: 315 LBS | BODY MASS INDEX: 47.74 KG/M2

## 2024-11-14 DIAGNOSIS — M79.89 OTHER SPECIFIED SOFT TISSUE DISORDERS: ICD-10-CM

## 2024-11-14 DIAGNOSIS — I48.91 UNSPECIFIED ATRIAL FIBRILLATION: ICD-10-CM

## 2024-11-14 DIAGNOSIS — B37.2 CANDIDIASIS OF SKIN AND NAIL: ICD-10-CM

## 2024-11-14 DIAGNOSIS — E11.9 TYPE 2 DIABETES MELLITUS W/OUT COMPLICATIONS: ICD-10-CM

## 2024-11-14 DIAGNOSIS — E53.8 DEFICIENCY OF OTHER SPECIFIED B GROUP VITAMINS: ICD-10-CM

## 2024-11-14 DIAGNOSIS — I10 ESSENTIAL (PRIMARY) HYPERTENSION: ICD-10-CM

## 2024-11-14 DIAGNOSIS — R13.10 DYSPHAGIA, UNSPECIFIED: ICD-10-CM

## 2024-11-14 DIAGNOSIS — E78.5 HYPERLIPIDEMIA, UNSPECIFIED: ICD-10-CM

## 2024-11-14 PROCEDURE — 99215 OFFICE O/P EST HI 40 MIN: CPT

## 2024-11-14 PROCEDURE — G2211 COMPLEX E/M VISIT ADD ON: CPT | Mod: NC

## 2024-11-14 PROCEDURE — 36415 COLL VENOUS BLD VENIPUNCTURE: CPT

## 2024-11-14 RX ORDER — NYSTATIN 100000 [USP'U]/G
100000 POWDER TOPICAL 3 TIMES DAILY
Qty: 1 | Refills: 1 | Status: ACTIVE | COMMUNITY
Start: 2024-11-14 | End: 1900-01-01

## 2024-11-15 ENCOUNTER — TRANSCRIPTION ENCOUNTER (OUTPATIENT)
Age: 64
End: 2024-11-15

## 2024-11-15 RX ORDER — METFORMIN HYDROCHLORIDE 1000 MG/1
1000 TABLET, COATED ORAL
Qty: 180 | Refills: 1 | Status: ACTIVE | COMMUNITY
Start: 2024-11-15 | End: 1900-01-01

## 2024-11-16 LAB
ALBUMIN SERPL ELPH-MCNC: 4.1 G/DL
ALP BLD-CCNC: 88 U/L
ALT SERPL-CCNC: 12 U/L
ANION GAP SERPL CALC-SCNC: 14 MMOL/L
AST SERPL-CCNC: 16 U/L
BILIRUB SERPL-MCNC: 0.4 MG/DL
BUN SERPL-MCNC: 18 MG/DL
CALCIUM SERPL-MCNC: 10 MG/DL
CHLORIDE SERPL-SCNC: 101 MMOL/L
CHOLEST SERPL-MCNC: 140 MG/DL
CO2 SERPL-SCNC: 26 MMOL/L
CREAT SERPL-MCNC: 1.02 MG/DL
CREAT SPEC-SCNC: 95 MG/DL
EGFR: 82 ML/MIN/1.73M2
ESTIMATED AVERAGE GLUCOSE: 137 MG/DL
GLUCOSE SERPL-MCNC: 138 MG/DL
HBA1C MFR BLD HPLC: 6.4 %
HCT VFR BLD CALC: 38.8 %
HDLC SERPL-MCNC: 34 MG/DL
HGB BLD-MCNC: 12.5 G/DL
LDLC SERPL CALC-MCNC: 79 MG/DL
MCHC RBC-ENTMCNC: 27.4 PG
MCHC RBC-ENTMCNC: 32.2 G/DL
MCV RBC AUTO: 84.9 FL
MICROALBUMIN 24H UR DL<=1MG/L-MCNC: 18.4 MG/DL
MICROALBUMIN/CREAT 24H UR-RTO: 193 MG/G
NONHDLC SERPL-MCNC: 106 MG/DL
PLATELET # BLD AUTO: 234 K/UL
POTASSIUM SERPL-SCNC: 4.8 MMOL/L
PROT SERPL-MCNC: 7.2 G/DL
RBC # BLD: 4.57 M/UL
RBC # FLD: 15.6 %
SODIUM SERPL-SCNC: 142 MMOL/L
TRIGL SERPL-MCNC: 155 MG/DL
VIT B12 SERPL-MCNC: 359 PG/ML
WBC # FLD AUTO: 8.61 K/UL

## 2025-01-02 ENCOUNTER — TRANSCRIPTION ENCOUNTER (OUTPATIENT)
Age: 65
End: 2025-01-02

## 2025-01-03 ENCOUNTER — TRANSCRIPTION ENCOUNTER (OUTPATIENT)
Age: 65
End: 2025-01-03

## 2025-01-09 ENCOUNTER — APPOINTMENT (OUTPATIENT)
Dept: ENDOCRINOLOGY | Facility: CLINIC | Age: 65
End: 2025-01-09
Payer: MEDICAID

## 2025-01-09 VITALS
DIASTOLIC BLOOD PRESSURE: 90 MMHG | SYSTOLIC BLOOD PRESSURE: 142 MMHG | HEIGHT: 68 IN | OXYGEN SATURATION: 95 % | HEART RATE: 128 BPM | BODY MASS INDEX: 47.74 KG/M2 | WEIGHT: 315 LBS | TEMPERATURE: 98 F

## 2025-01-09 VITALS — DIASTOLIC BLOOD PRESSURE: 84 MMHG | SYSTOLIC BLOOD PRESSURE: 140 MMHG

## 2025-01-09 DIAGNOSIS — I10 ESSENTIAL (PRIMARY) HYPERTENSION: ICD-10-CM

## 2025-01-09 DIAGNOSIS — E78.5 HYPERLIPIDEMIA, UNSPECIFIED: ICD-10-CM

## 2025-01-09 DIAGNOSIS — E11.9 TYPE 2 DIABETES MELLITUS W/OUT COMPLICATIONS: ICD-10-CM

## 2025-01-09 DIAGNOSIS — E66.9 OBESITY, UNSPECIFIED: ICD-10-CM

## 2025-01-09 PROCEDURE — G2211 COMPLEX E/M VISIT ADD ON: CPT | Mod: NC

## 2025-01-09 PROCEDURE — 99214 OFFICE O/P EST MOD 30 MIN: CPT

## 2025-01-09 PROCEDURE — 83036 HEMOGLOBIN GLYCOSYLATED A1C: CPT | Mod: QW

## 2025-01-09 PROCEDURE — 82962 GLUCOSE BLOOD TEST: CPT

## 2025-01-10 LAB
GLUCOSE BLDC GLUCOMTR-MCNC: 161
HBA1C MFR BLD HPLC: 7.2

## 2025-01-29 ENCOUNTER — RX RENEWAL (OUTPATIENT)
Age: 65
End: 2025-01-29

## 2025-02-17 ENCOUNTER — APPOINTMENT (OUTPATIENT)
Dept: INTERNAL MEDICINE | Facility: CLINIC | Age: 65
End: 2025-02-17
Payer: MEDICAID

## 2025-02-17 VITALS
DIASTOLIC BLOOD PRESSURE: 82 MMHG | WEIGHT: 315 LBS | HEIGHT: 68 IN | TEMPERATURE: 98.9 F | BODY MASS INDEX: 47.74 KG/M2 | HEART RATE: 97 BPM | OXYGEN SATURATION: 96 % | SYSTOLIC BLOOD PRESSURE: 135 MMHG | RESPIRATION RATE: 16 BRPM

## 2025-02-17 DIAGNOSIS — I48.91 UNSPECIFIED ATRIAL FIBRILLATION: ICD-10-CM

## 2025-02-17 DIAGNOSIS — E78.5 HYPERLIPIDEMIA, UNSPECIFIED: ICD-10-CM

## 2025-02-17 DIAGNOSIS — E11.9 TYPE 2 DIABETES MELLITUS W/OUT COMPLICATIONS: ICD-10-CM

## 2025-02-17 DIAGNOSIS — I10 ESSENTIAL (PRIMARY) HYPERTENSION: ICD-10-CM

## 2025-02-17 PROCEDURE — G2211 COMPLEX E/M VISIT ADD ON: CPT | Mod: NC

## 2025-02-17 PROCEDURE — 99214 OFFICE O/P EST MOD 30 MIN: CPT

## 2025-02-17 PROCEDURE — 36415 COLL VENOUS BLD VENIPUNCTURE: CPT

## 2025-02-18 DIAGNOSIS — D64.9 ANEMIA, UNSPECIFIED: ICD-10-CM

## 2025-02-19 ENCOUNTER — RX RENEWAL (OUTPATIENT)
Age: 65
End: 2025-02-19

## 2025-02-21 ENCOUNTER — TRANSCRIPTION ENCOUNTER (OUTPATIENT)
Age: 65
End: 2025-02-21

## 2025-02-26 LAB
ALBUMIN SERPL ELPH-MCNC: 4.1 G/DL
ALP BLD-CCNC: 86 U/L
ALT SERPL-CCNC: 11 U/L
ANION GAP SERPL CALC-SCNC: 10 MMOL/L
AST SERPL-CCNC: 14 U/L
BILIRUB SERPL-MCNC: 0.4 MG/DL
BUN SERPL-MCNC: 16 MG/DL
CALCIUM SERPL-MCNC: 9.5 MG/DL
CHLORIDE SERPL-SCNC: 100 MMOL/L
CHOLEST SERPL-MCNC: 118 MG/DL
CO2 SERPL-SCNC: 29 MMOL/L
CREAT SERPL-MCNC: 0.87 MG/DL
EGFR: 96 ML/MIN/1.73M2
ESTIMATED AVERAGE GLUCOSE: 166 MG/DL
FERRITIN SERPL-MCNC: 62 NG/ML
FOLATE SERPL-MCNC: >20 NG/ML
GLUCOSE SERPL-MCNC: 173 MG/DL
HBA1C MFR BLD HPLC: 7.4 %
HCT VFR BLD CALC: 36.5 %
HDLC SERPL-MCNC: 32 MG/DL
HGB BLD-MCNC: 11.8 G/DL
IRON SATN MFR SERPL: 17 %
IRON SERPL-MCNC: 44 UG/DL
LDLC SERPL CALC-MCNC: 64 MG/DL
MCHC RBC-ENTMCNC: 27.8 PG
MCHC RBC-ENTMCNC: 32.3 G/DL
MCV RBC AUTO: 85.9 FL
NONHDLC SERPL-MCNC: 86 MG/DL
PLATELET # BLD AUTO: 169 K/UL
POTASSIUM SERPL-SCNC: 4.5 MMOL/L
PROT SERPL-MCNC: 6.8 G/DL
RBC # BLD: 4.25 M/UL
RBC # FLD: 14.9 %
SODIUM SERPL-SCNC: 139 MMOL/L
TIBC SERPL-MCNC: 260 UG/DL
TRIGL SERPL-MCNC: 123 MG/DL
UIBC SERPL-MCNC: 216 UG/DL
VIT B12 SERPL-MCNC: 365 PG/ML
VIT B12 SERPL-MCNC: 440 PG/ML
WBC # FLD AUTO: 6.75 K/UL

## 2025-03-10 ENCOUNTER — TRANSCRIPTION ENCOUNTER (OUTPATIENT)
Age: 65
End: 2025-03-10

## 2025-04-09 ENCOUNTER — TRANSCRIPTION ENCOUNTER (OUTPATIENT)
Age: 65
End: 2025-04-09

## 2025-04-10 ENCOUNTER — APPOINTMENT (OUTPATIENT)
Dept: PULMONOLOGY | Facility: CLINIC | Age: 65
End: 2025-04-10
Payer: MEDICAID

## 2025-04-10 VITALS
WEIGHT: 315 LBS | DIASTOLIC BLOOD PRESSURE: 92 MMHG | RESPIRATION RATE: 17 BRPM | HEART RATE: 111 BPM | SYSTOLIC BLOOD PRESSURE: 166 MMHG | HEIGHT: 68 IN | OXYGEN SATURATION: 95 % | BODY MASS INDEX: 47.74 KG/M2

## 2025-04-10 VITALS — SYSTOLIC BLOOD PRESSURE: 147 MMHG | DIASTOLIC BLOOD PRESSURE: 92 MMHG

## 2025-04-10 DIAGNOSIS — G47.33 OBSTRUCTIVE SLEEP APNEA (ADULT) (PEDIATRIC): ICD-10-CM

## 2025-04-10 PROCEDURE — 99215 OFFICE O/P EST HI 40 MIN: CPT

## 2025-04-15 ENCOUNTER — TRANSCRIPTION ENCOUNTER (OUTPATIENT)
Age: 65
End: 2025-04-15

## 2025-04-17 ENCOUNTER — TRANSCRIPTION ENCOUNTER (OUTPATIENT)
Age: 65
End: 2025-04-17

## 2025-05-05 ENCOUNTER — TRANSCRIPTION ENCOUNTER (OUTPATIENT)
Age: 65
End: 2025-05-05

## 2025-05-07 ENCOUNTER — TRANSCRIPTION ENCOUNTER (OUTPATIENT)
Age: 65
End: 2025-05-07

## 2025-05-12 ENCOUNTER — TRANSCRIPTION ENCOUNTER (OUTPATIENT)
Age: 65
End: 2025-05-12

## 2025-05-14 ENCOUNTER — TRANSCRIPTION ENCOUNTER (OUTPATIENT)
Age: 65
End: 2025-05-14

## 2025-05-16 ENCOUNTER — TRANSCRIPTION ENCOUNTER (OUTPATIENT)
Age: 65
End: 2025-05-16

## 2025-05-19 ENCOUNTER — RX CHANGE (OUTPATIENT)
Age: 65
End: 2025-05-19

## 2025-05-19 RX ORDER — CARVEDILOL 25 MG/1
25 TABLET, FILM COATED ORAL
Qty: 180 | Refills: 1 | Status: ACTIVE | COMMUNITY
Start: 1900-01-01 | End: 1900-01-01

## 2025-06-09 ENCOUNTER — TRANSCRIPTION ENCOUNTER (OUTPATIENT)
Age: 65
End: 2025-06-09

## 2025-06-09 ENCOUNTER — RX CHANGE (OUTPATIENT)
Age: 65
End: 2025-06-09

## 2025-06-09 RX ORDER — AMLODIPINE BESYLATE 2.5 MG/1
2.5 TABLET ORAL
Qty: 90 | Refills: 3 | Status: ACTIVE | COMMUNITY
Start: 1900-01-01 | End: 1900-01-01

## 2025-06-09 RX ORDER — SIMVASTATIN 10 MG/1
10 TABLET, FILM COATED ORAL
Qty: 90 | Refills: 1 | Status: ACTIVE | COMMUNITY
Start: 1900-01-01 | End: 1900-01-01

## 2025-06-09 RX ORDER — CARVEDILOL 12.5 MG/1
12.5 TABLET, FILM COATED ORAL
Qty: 180 | Refills: 3 | Status: ACTIVE | COMMUNITY
Start: 1900-01-01 | End: 1900-01-01

## 2025-06-10 ENCOUNTER — APPOINTMENT (OUTPATIENT)
Dept: ENDOCRINOLOGY | Facility: CLINIC | Age: 65
End: 2025-06-10
Payer: MEDICAID

## 2025-06-10 VITALS
HEIGHT: 68 IN | WEIGHT: 315 LBS | HEART RATE: 100 BPM | DIASTOLIC BLOOD PRESSURE: 82 MMHG | SYSTOLIC BLOOD PRESSURE: 126 MMHG | OXYGEN SATURATION: 97 % | BODY MASS INDEX: 47.74 KG/M2

## 2025-06-10 LAB
GLUCOSE BLDC GLUCOMTR-MCNC: 187
HBA1C MFR BLD HPLC: 7.9

## 2025-06-10 PROCEDURE — 82962 GLUCOSE BLOOD TEST: CPT

## 2025-06-10 PROCEDURE — 99214 OFFICE O/P EST MOD 30 MIN: CPT

## 2025-06-10 PROCEDURE — G2211 COMPLEX E/M VISIT ADD ON: CPT | Mod: NC

## 2025-06-10 PROCEDURE — 83036 HEMOGLOBIN GLYCOSYLATED A1C: CPT | Mod: QW

## 2025-06-10 PROCEDURE — 36415 COLL VENOUS BLD VENIPUNCTURE: CPT

## 2025-06-11 LAB
25(OH)D3 SERPL-MCNC: 42.3 NG/ML
ALBUMIN SERPL ELPH-MCNC: 3.9 G/DL
ALP BLD-CCNC: 87 U/L
ALT SERPL-CCNC: 17 U/L
ANION GAP SERPL CALC-SCNC: 14 MMOL/L
AST SERPL-CCNC: 22 U/L
BASOPHILS # BLD AUTO: 0.05 K/UL
BASOPHILS NFR BLD AUTO: 0.7 %
BILIRUB SERPL-MCNC: 0.4 MG/DL
BUN SERPL-MCNC: 18 MG/DL
CALCIUM SERPL-MCNC: 9.8 MG/DL
CHLORIDE SERPL-SCNC: 102 MMOL/L
CHOLEST SERPL-MCNC: 131 MG/DL
CO2 SERPL-SCNC: 24 MMOL/L
CREAT SERPL-MCNC: 0.88 MG/DL
CREAT SPEC-SCNC: 115 MG/DL
EGFRCR SERPLBLD CKD-EPI 2021: 95 ML/MIN/1.73M2
EOSINOPHIL # BLD AUTO: 0.24 K/UL
EOSINOPHIL NFR BLD AUTO: 3.6 %
ESTIMATED AVERAGE GLUCOSE: 174 MG/DL
FRUCTOSAMINE SERPL-MCNC: 261 UMOL/L
GLUCOSE SERPL-MCNC: 157 MG/DL
GLYCOMARK.: 21.1 UG/ML
HBA1C MFR BLD HPLC: 7.7 %
HCT VFR BLD CALC: 38.2 %
HDLC SERPL-MCNC: 32 MG/DL
HGB BLD-MCNC: 11.8 G/DL
IMM GRANULOCYTES NFR BLD AUTO: 0.1 %
LDLC SERPL DIRECT ASSAY-MCNC: 78 MG/DL
LYMPHOCYTES # BLD AUTO: 1.29 K/UL
LYMPHOCYTES NFR BLD AUTO: 19.1 %
MAGNESIUM SERPL-MCNC: 2 MG/DL
MAN DIFF?: NORMAL
MCHC RBC-ENTMCNC: 27.8 PG
MCHC RBC-ENTMCNC: 30.9 G/DL
MCV RBC AUTO: 89.9 FL
MICROALBUMIN 24H UR DL<=1MG/L-MCNC: 48.6 MG/DL
MICROALBUMIN/CREAT 24H UR-RTO: 423 MG/G
MONOCYTES # BLD AUTO: 0.61 K/UL
MONOCYTES NFR BLD AUTO: 9.1 %
NEUTROPHILS # BLD AUTO: 4.54 K/UL
NEUTROPHILS NFR BLD AUTO: 67.4 %
PLATELET # BLD AUTO: 179 K/UL
POTASSIUM SERPL-SCNC: 5.2 MMOL/L
PROT SERPL-MCNC: 6.6 G/DL
RBC # BLD: 4.25 M/UL
RBC # FLD: 15.8 %
SODIUM SERPL-SCNC: 140 MMOL/L
T3FREE SERPL-MCNC: 2.87 PG/ML
T4 FREE SERPL-MCNC: 1 NG/DL
TRIGL SERPL-MCNC: 122 MG/DL
TSH SERPL-ACNC: 3.55 UIU/ML
WBC # FLD AUTO: 6.74 K/UL

## 2025-06-11 RX ORDER — GLIMEPIRIDE 1 MG/1
1 TABLET ORAL
Qty: 45 | Refills: 3 | Status: ACTIVE | COMMUNITY
Start: 2025-06-11 | End: 1900-01-01

## 2025-06-12 ENCOUNTER — TRANSCRIPTION ENCOUNTER (OUTPATIENT)
Age: 65
End: 2025-06-12

## 2025-07-07 ENCOUNTER — RX CHANGE (OUTPATIENT)
Age: 65
End: 2025-07-07

## 2025-07-07 ENCOUNTER — TRANSCRIPTION ENCOUNTER (OUTPATIENT)
Age: 65
End: 2025-07-07

## 2025-07-07 RX ORDER — BUMETANIDE 2 MG/1
2 TABLET ORAL
Qty: 90 | Refills: 3 | Status: ACTIVE | COMMUNITY
Start: 1900-01-01 | End: 1900-01-01

## 2025-07-07 RX ORDER — POTASSIUM CHLORIDE 750 MG/1
10 CAPSULE, EXTENDED RELEASE ORAL
Qty: 180 | Refills: 3 | Status: ACTIVE | COMMUNITY
Start: 1900-01-01 | End: 1900-01-01

## 2025-07-09 ENCOUNTER — RESULT CHARGE (OUTPATIENT)
Age: 65
End: 2025-07-09

## 2025-07-09 ENCOUNTER — RX CHANGE (OUTPATIENT)
Age: 65
End: 2025-07-09

## 2025-07-10 ENCOUNTER — APPOINTMENT (OUTPATIENT)
Dept: INTERNAL MEDICINE | Facility: CLINIC | Age: 65
End: 2025-07-10
Payer: MEDICARE

## 2025-07-10 VITALS
WEIGHT: 315 LBS | HEART RATE: 99 BPM | SYSTOLIC BLOOD PRESSURE: 150 MMHG | DIASTOLIC BLOOD PRESSURE: 85 MMHG | TEMPERATURE: 97.1 F | RESPIRATION RATE: 16 BRPM | BODY MASS INDEX: 47.74 KG/M2 | HEIGHT: 68 IN | OXYGEN SATURATION: 96 %

## 2025-07-10 PROCEDURE — G0402 INITIAL PREVENTIVE EXAM: CPT

## 2025-07-10 PROCEDURE — G0439: CPT

## 2025-07-10 PROCEDURE — 93000 ELECTROCARDIOGRAM COMPLETE: CPT

## 2025-07-10 PROCEDURE — 36415 COLL VENOUS BLD VENIPUNCTURE: CPT

## 2025-07-11 LAB — PSA SERPL-MCNC: 0.9 NG/ML

## 2025-07-14 ENCOUNTER — TRANSCRIPTION ENCOUNTER (OUTPATIENT)
Age: 65
End: 2025-07-14

## 2025-07-14 RX ORDER — CARVEDILOL 25 MG/1
25 TABLET, FILM COATED ORAL
Qty: 180 | Refills: 0 | Status: ACTIVE | COMMUNITY
Start: 1900-01-01 | End: 1900-01-01

## 2025-07-16 ENCOUNTER — TRANSCRIPTION ENCOUNTER (OUTPATIENT)
Age: 65
End: 2025-07-16

## 2025-07-16 ENCOUNTER — RX CHANGE (OUTPATIENT)
Age: 65
End: 2025-07-16

## 2025-07-16 ENCOUNTER — APPOINTMENT (OUTPATIENT)
Dept: CARDIOLOGY | Facility: CLINIC | Age: 65
End: 2025-07-16
Payer: MEDICARE

## 2025-07-16 VITALS — HEART RATE: 90 BPM | BODY MASS INDEX: 63.86 KG/M2 | WEIGHT: 315 LBS | OXYGEN SATURATION: 96 %

## 2025-07-16 VITALS
WEIGHT: 315 LBS | HEIGHT: 68 IN | RESPIRATION RATE: 17 BRPM | BODY MASS INDEX: 47.74 KG/M2 | HEART RATE: 85 BPM | DIASTOLIC BLOOD PRESSURE: 80 MMHG | SYSTOLIC BLOOD PRESSURE: 135 MMHG

## 2025-07-16 PROCEDURE — 99204 OFFICE O/P NEW MOD 45 MIN: CPT

## 2025-07-16 PROCEDURE — G2211 COMPLEX E/M VISIT ADD ON: CPT

## 2025-07-16 PROCEDURE — 93000 ELECTROCARDIOGRAM COMPLETE: CPT

## 2025-07-17 ENCOUNTER — APPOINTMENT (OUTPATIENT)
Dept: CARDIOLOGY | Facility: CLINIC | Age: 65
End: 2025-07-17
Payer: MEDICARE

## 2025-07-17 PROCEDURE — 93306 TTE W/DOPPLER COMPLETE: CPT

## 2025-07-29 ENCOUNTER — TRANSCRIPTION ENCOUNTER (OUTPATIENT)
Age: 65
End: 2025-07-29

## 2025-07-30 ENCOUNTER — TRANSCRIPTION ENCOUNTER (OUTPATIENT)
Age: 65
End: 2025-07-30

## 2025-09-09 ENCOUNTER — RX RENEWAL (OUTPATIENT)
Age: 65
End: 2025-09-09

## 2025-09-11 ENCOUNTER — TRANSCRIPTION ENCOUNTER (OUTPATIENT)
Age: 65
End: 2025-09-11

## 2025-09-17 ENCOUNTER — TRANSCRIPTION ENCOUNTER (OUTPATIENT)
Age: 65
End: 2025-09-17